# Patient Record
Sex: FEMALE | Race: WHITE | Employment: OTHER | URBAN - METROPOLITAN AREA
[De-identification: names, ages, dates, MRNs, and addresses within clinical notes are randomized per-mention and may not be internally consistent; named-entity substitution may affect disease eponyms.]

---

## 2017-01-01 ENCOUNTER — OFFICE VISIT (OUTPATIENT)
Dept: HEMATOLOGY/ONCOLOGY | Age: 82
End: 2017-01-01
Attending: INTERNAL MEDICINE
Payer: MEDICARE

## 2017-01-01 ENCOUNTER — APPOINTMENT (OUTPATIENT)
Dept: GENERAL RADIOLOGY | Age: 82
End: 2017-01-01
Attending: EMERGENCY MEDICINE
Payer: MEDICARE

## 2017-01-01 ENCOUNTER — APPOINTMENT (OUTPATIENT)
Dept: HEMATOLOGY/ONCOLOGY | Age: 82
End: 2017-01-01
Attending: INTERNAL MEDICINE
Payer: MEDICARE

## 2017-01-01 ENCOUNTER — SOCIAL WORK SERVICES (OUTPATIENT)
Dept: HEMATOLOGY/ONCOLOGY | Facility: HOSPITAL | Age: 82
End: 2017-01-01

## 2017-01-01 ENCOUNTER — TELEPHONE (OUTPATIENT)
Dept: HEMATOLOGY/ONCOLOGY | Facility: HOSPITAL | Age: 82
End: 2017-01-01

## 2017-01-01 ENCOUNTER — HOSPITAL ENCOUNTER (OUTPATIENT)
Dept: ULTRASOUND IMAGING | Age: 82
Discharge: HOME | End: 2017-01-01
Attending: INTERNAL MEDICINE
Payer: MEDICARE

## 2017-01-01 ENCOUNTER — SNF/IP PROF CHARGE ONLY (OUTPATIENT)
Dept: HEMATOLOGY/ONCOLOGY | Facility: HOSPITAL | Age: 82
End: 2017-01-01

## 2017-01-01 ENCOUNTER — HOSPITAL ENCOUNTER (EMERGENCY)
Age: 82
Discharge: HOME | End: 2017-01-01
Attending: EMERGENCY MEDICINE
Payer: MEDICARE

## 2017-01-01 ENCOUNTER — HOSPITAL ENCOUNTER (OUTPATIENT)
Dept: RADIATION ONCOLOGY | Age: 82
Discharge: HOME | End: 2017-01-01
Attending: RADIOLOGY
Payer: MEDICARE

## 2017-01-01 ENCOUNTER — APPOINTMENT (OUTPATIENT)
Dept: ULTRASOUND IMAGING | Age: 82
End: 2017-01-01
Attending: EMERGENCY MEDICINE
Payer: MEDICARE

## 2017-01-01 ENCOUNTER — NURSE ONLY (OUTPATIENT)
Dept: HEMATOLOGY/ONCOLOGY | Age: 82
End: 2017-01-01
Attending: INTERNAL MEDICINE
Payer: MEDICARE

## 2017-01-01 ENCOUNTER — OFFICE VISIT (OUTPATIENT)
Dept: HEMATOLOGY/ONCOLOGY | Facility: HOSPITAL | Age: 82
End: 2017-01-01
Attending: INTERNAL MEDICINE
Payer: MEDICARE

## 2017-01-01 ENCOUNTER — HOSPITAL ENCOUNTER (OUTPATIENT)
Dept: CV DIAGNOSTICS | Facility: HOSPITAL | Age: 82
Discharge: HOME | End: 2017-01-01
Attending: CLINICAL NURSE SPECIALIST
Payer: MEDICARE

## 2017-01-01 ENCOUNTER — APPOINTMENT (OUTPATIENT)
Dept: RADIATION ONCOLOGY | Age: 82
End: 2017-01-01
Attending: RADIOLOGY
Payer: MEDICARE

## 2017-01-01 ENCOUNTER — HOSPITAL ENCOUNTER (OUTPATIENT)
Dept: NUCLEAR MEDICINE | Facility: HOSPITAL | Age: 82
Discharge: HOME | End: 2017-01-01
Attending: INTERNAL MEDICINE
Payer: MEDICARE

## 2017-01-01 ENCOUNTER — MEDICAL CORRESPONDENCE (OUTPATIENT)
Dept: RADIATION ONCOLOGY | Age: 82
End: 2017-01-01

## 2017-01-01 ENCOUNTER — HOSPITAL ENCOUNTER (OUTPATIENT)
Dept: CT IMAGING | Facility: HOSPITAL | Age: 82
Discharge: HOME | End: 2017-01-01
Attending: CLINICAL NURSE SPECIALIST
Payer: MEDICARE

## 2017-01-01 ENCOUNTER — HOSPITAL ENCOUNTER (INPATIENT)
Facility: HOSPITAL | Age: 82
LOS: 4 days | Discharge: NURSING, REHAB OR LONG-TERM CARE | DRG: 948 | End: 2017-01-01
Attending: HOSPITALIST | Admitting: HOSPITALIST
Payer: MEDICARE

## 2017-01-01 ENCOUNTER — HOSPITAL ENCOUNTER (OUTPATIENT)
Dept: CT IMAGING | Age: 82
Discharge: HOME | End: 2017-01-01
Attending: INTERNAL MEDICINE
Payer: MEDICARE

## 2017-01-01 ENCOUNTER — PATIENT MESSAGE (OUTPATIENT)
Dept: HEMATOLOGY/ONCOLOGY | Facility: HOSPITAL | Age: 82
End: 2017-01-01

## 2017-01-01 ENCOUNTER — DIETICIAN VISIT (OUTPATIENT)
Dept: HEMATOLOGY/ONCOLOGY | Facility: HOSPITAL | Age: 82
End: 2017-01-01

## 2017-01-01 ENCOUNTER — HOSPITAL ENCOUNTER (OUTPATIENT)
Dept: RADIATION ONCOLOGY | Age: 82
End: 2017-01-01
Attending: RADIOLOGY
Payer: MEDICARE

## 2017-01-01 ENCOUNTER — HOSPITAL ENCOUNTER (OUTPATIENT)
Dept: GENERAL RADIOLOGY | Age: 82
Discharge: HOME | End: 2017-01-01
Attending: NURSE PRACTITIONER
Payer: MEDICARE

## 2017-01-01 ENCOUNTER — APPOINTMENT (OUTPATIENT)
Dept: GENERAL RADIOLOGY | Facility: HOSPITAL | Age: 82
DRG: 948 | End: 2017-01-01
Attending: HOSPITALIST
Payer: MEDICARE

## 2017-01-01 ENCOUNTER — LAB ENCOUNTER (OUTPATIENT)
Dept: LAB | Facility: HOSPITAL | Age: 82
End: 2017-01-01
Attending: INTERNAL MEDICINE
Payer: MEDICARE

## 2017-01-01 ENCOUNTER — APPOINTMENT (OUTPATIENT)
Dept: GENERAL RADIOLOGY | Facility: HOSPITAL | Age: 82
DRG: 948 | End: 2017-01-01
Attending: CLINICAL NURSE SPECIALIST
Payer: MEDICARE

## 2017-01-01 VITALS
TEMPERATURE: 99 F | SYSTOLIC BLOOD PRESSURE: 123 MMHG | WEIGHT: 121.19 LBS | HEART RATE: 67 BPM | DIASTOLIC BLOOD PRESSURE: 68 MMHG | RESPIRATION RATE: 18 BRPM | OXYGEN SATURATION: 94 % | BODY MASS INDEX: 22 KG/M2

## 2017-01-01 VITALS
SYSTOLIC BLOOD PRESSURE: 119 MMHG | RESPIRATION RATE: 16 BRPM | BODY MASS INDEX: 22.62 KG/M2 | WEIGHT: 124.5 LBS | HEART RATE: 82 BPM | DIASTOLIC BLOOD PRESSURE: 57 MMHG | OXYGEN SATURATION: 95 % | HEIGHT: 62.01 IN | TEMPERATURE: 97 F

## 2017-01-01 VITALS
SYSTOLIC BLOOD PRESSURE: 114 MMHG | DIASTOLIC BLOOD PRESSURE: 58 MMHG | HEART RATE: 71 BPM | TEMPERATURE: 97 F | OXYGEN SATURATION: 94 % | RESPIRATION RATE: 18 BRPM | WEIGHT: 130 LBS

## 2017-01-01 VITALS
SYSTOLIC BLOOD PRESSURE: 116 MMHG | WEIGHT: 123 LBS | DIASTOLIC BLOOD PRESSURE: 58 MMHG | OXYGEN SATURATION: 91 % | RESPIRATION RATE: 16 BRPM | HEIGHT: 61 IN | HEART RATE: 80 BPM | TEMPERATURE: 98 F | BODY MASS INDEX: 23.22 KG/M2

## 2017-01-01 VITALS
HEART RATE: 71 BPM | BODY MASS INDEX: 23.22 KG/M2 | SYSTOLIC BLOOD PRESSURE: 129 MMHG | RESPIRATION RATE: 18 BRPM | OXYGEN SATURATION: 98 % | DIASTOLIC BLOOD PRESSURE: 63 MMHG | DIASTOLIC BLOOD PRESSURE: 63 MMHG | WEIGHT: 128.88 LBS | SYSTOLIC BLOOD PRESSURE: 124 MMHG | TEMPERATURE: 99 F | HEART RATE: 72 BPM | HEIGHT: 61 IN | OXYGEN SATURATION: 95 % | WEIGHT: 123 LBS | TEMPERATURE: 98 F | RESPIRATION RATE: 16 BRPM

## 2017-01-01 VITALS
WEIGHT: 124.81 LBS | BODY MASS INDEX: 23 KG/M2 | OXYGEN SATURATION: 97 % | RESPIRATION RATE: 16 BRPM | HEART RATE: 96 BPM | SYSTOLIC BLOOD PRESSURE: 124 MMHG | TEMPERATURE: 97 F | DIASTOLIC BLOOD PRESSURE: 77 MMHG

## 2017-01-01 VITALS
DIASTOLIC BLOOD PRESSURE: 63 MMHG | RESPIRATION RATE: 18 BRPM | HEART RATE: 61 BPM | OXYGEN SATURATION: 99 % | TEMPERATURE: 98 F | SYSTOLIC BLOOD PRESSURE: 138 MMHG | WEIGHT: 127.5 LBS

## 2017-01-01 VITALS
BODY MASS INDEX: 21.71 KG/M2 | RESPIRATION RATE: 18 BRPM | OXYGEN SATURATION: 97 % | SYSTOLIC BLOOD PRESSURE: 110 MMHG | WEIGHT: 119.5 LBS | HEART RATE: 71 BPM | HEIGHT: 62.01 IN | TEMPERATURE: 98 F | DIASTOLIC BLOOD PRESSURE: 55 MMHG

## 2017-01-01 VITALS
HEIGHT: 60 IN | WEIGHT: 118.63 LBS | DIASTOLIC BLOOD PRESSURE: 69 MMHG | SYSTOLIC BLOOD PRESSURE: 116 MMHG | TEMPERATURE: 98 F | RESPIRATION RATE: 18 BRPM | OXYGEN SATURATION: 96 % | HEART RATE: 74 BPM | BODY MASS INDEX: 23.29 KG/M2

## 2017-01-01 VITALS
SYSTOLIC BLOOD PRESSURE: 115 MMHG | OXYGEN SATURATION: 99 % | HEART RATE: 80 BPM | RESPIRATION RATE: 18 BRPM | DIASTOLIC BLOOD PRESSURE: 74 MMHG | WEIGHT: 129 LBS | TEMPERATURE: 97 F

## 2017-01-01 VITALS
SYSTOLIC BLOOD PRESSURE: 112 MMHG | TEMPERATURE: 100 F | OXYGEN SATURATION: 92 % | RESPIRATION RATE: 18 BRPM | HEART RATE: 85 BPM | DIASTOLIC BLOOD PRESSURE: 69 MMHG

## 2017-01-01 VITALS
RESPIRATION RATE: 18 BRPM | OXYGEN SATURATION: 95 % | TEMPERATURE: 97 F | WEIGHT: 129 LBS | SYSTOLIC BLOOD PRESSURE: 111 MMHG | HEART RATE: 73 BPM | DIASTOLIC BLOOD PRESSURE: 66 MMHG

## 2017-01-01 VITALS
TEMPERATURE: 96 F | SYSTOLIC BLOOD PRESSURE: 131 MMHG | WEIGHT: 126.63 LBS | BODY MASS INDEX: 24 KG/M2 | DIASTOLIC BLOOD PRESSURE: 77 MMHG | HEART RATE: 80 BPM | RESPIRATION RATE: 16 BRPM | OXYGEN SATURATION: 95 %

## 2017-01-01 VITALS
HEART RATE: 93 BPM | RESPIRATION RATE: 18 BRPM | SYSTOLIC BLOOD PRESSURE: 134 MMHG | DIASTOLIC BLOOD PRESSURE: 69 MMHG | TEMPERATURE: 98 F | BODY MASS INDEX: 23 KG/M2 | OXYGEN SATURATION: 98 % | WEIGHT: 125.38 LBS

## 2017-01-01 VITALS
BODY MASS INDEX: 22.67 KG/M2 | TEMPERATURE: 99 F | HEIGHT: 62 IN | HEART RATE: 75 BPM | DIASTOLIC BLOOD PRESSURE: 65 MMHG | WEIGHT: 123.19 LBS | OXYGEN SATURATION: 97 % | SYSTOLIC BLOOD PRESSURE: 111 MMHG | RESPIRATION RATE: 18 BRPM

## 2017-01-01 VITALS
DIASTOLIC BLOOD PRESSURE: 70 MMHG | WEIGHT: 125.63 LBS | RESPIRATION RATE: 18 BRPM | TEMPERATURE: 97 F | SYSTOLIC BLOOD PRESSURE: 109 MMHG | OXYGEN SATURATION: 96 % | HEART RATE: 75 BPM | BODY MASS INDEX: 23 KG/M2

## 2017-01-01 VITALS
DIASTOLIC BLOOD PRESSURE: 66 MMHG | BODY MASS INDEX: 23 KG/M2 | HEART RATE: 79 BPM | TEMPERATURE: 97 F | WEIGHT: 123.38 LBS | SYSTOLIC BLOOD PRESSURE: 120 MMHG | RESPIRATION RATE: 18 BRPM | OXYGEN SATURATION: 95 %

## 2017-01-01 VITALS
SYSTOLIC BLOOD PRESSURE: 116 MMHG | HEART RATE: 84 BPM | TEMPERATURE: 99 F | OXYGEN SATURATION: 95 % | RESPIRATION RATE: 18 BRPM | DIASTOLIC BLOOD PRESSURE: 72 MMHG | WEIGHT: 120 LBS | BODY MASS INDEX: 22 KG/M2

## 2017-01-01 VITALS
HEART RATE: 67 BPM | TEMPERATURE: 99 F | SYSTOLIC BLOOD PRESSURE: 113 MMHG | DIASTOLIC BLOOD PRESSURE: 60 MMHG | WEIGHT: 125.5 LBS | OXYGEN SATURATION: 95 % | RESPIRATION RATE: 18 BRPM

## 2017-01-01 VITALS
RESPIRATION RATE: 18 BRPM | DIASTOLIC BLOOD PRESSURE: 68 MMHG | TEMPERATURE: 99 F | HEART RATE: 79 BPM | WEIGHT: 125.63 LBS | SYSTOLIC BLOOD PRESSURE: 119 MMHG | BODY MASS INDEX: 23 KG/M2 | OXYGEN SATURATION: 97 %

## 2017-01-01 VITALS
BODY MASS INDEX: 23 KG/M2 | HEART RATE: 73 BPM | OXYGEN SATURATION: 94 % | RESPIRATION RATE: 18 BRPM | WEIGHT: 123.38 LBS | SYSTOLIC BLOOD PRESSURE: 116 MMHG | TEMPERATURE: 99 F | DIASTOLIC BLOOD PRESSURE: 68 MMHG

## 2017-01-01 VITALS
BODY MASS INDEX: 22.56 KG/M2 | HEIGHT: 62 IN | TEMPERATURE: 99 F | OXYGEN SATURATION: 92 % | RESPIRATION RATE: 18 BRPM | DIASTOLIC BLOOD PRESSURE: 53 MMHG | WEIGHT: 122.63 LBS | SYSTOLIC BLOOD PRESSURE: 113 MMHG | HEART RATE: 78 BPM

## 2017-01-01 VITALS
WEIGHT: 124.38 LBS | OXYGEN SATURATION: 91 % | DIASTOLIC BLOOD PRESSURE: 69 MMHG | RESPIRATION RATE: 16 BRPM | HEART RATE: 68 BPM | SYSTOLIC BLOOD PRESSURE: 142 MMHG | TEMPERATURE: 97 F | BODY MASS INDEX: 23 KG/M2

## 2017-01-01 VITALS
SYSTOLIC BLOOD PRESSURE: 133 MMHG | OXYGEN SATURATION: 96 % | RESPIRATION RATE: 18 BRPM | HEART RATE: 69 BPM | TEMPERATURE: 98 F | WEIGHT: 122 LBS | DIASTOLIC BLOOD PRESSURE: 65 MMHG

## 2017-01-01 VITALS
WEIGHT: 130 LBS | HEART RATE: 80 BPM | SYSTOLIC BLOOD PRESSURE: 116 MMHG | DIASTOLIC BLOOD PRESSURE: 72 MMHG | OXYGEN SATURATION: 93 % | RESPIRATION RATE: 18 BRPM | TEMPERATURE: 99 F

## 2017-01-01 VITALS
OXYGEN SATURATION: 98 % | HEART RATE: 71 BPM | SYSTOLIC BLOOD PRESSURE: 132 MMHG | BODY MASS INDEX: 23 KG/M2 | RESPIRATION RATE: 20 BRPM | TEMPERATURE: 97 F | WEIGHT: 124.81 LBS | DIASTOLIC BLOOD PRESSURE: 79 MMHG

## 2017-01-01 VITALS
SYSTOLIC BLOOD PRESSURE: 117 MMHG | OXYGEN SATURATION: 96 % | TEMPERATURE: 99 F | DIASTOLIC BLOOD PRESSURE: 68 MMHG | HEART RATE: 85 BPM | RESPIRATION RATE: 18 BRPM

## 2017-01-01 VITALS
SYSTOLIC BLOOD PRESSURE: 126 MMHG | TEMPERATURE: 99 F | OXYGEN SATURATION: 97 % | HEART RATE: 76 BPM | DIASTOLIC BLOOD PRESSURE: 82 MMHG | RESPIRATION RATE: 16 BRPM

## 2017-01-01 VITALS
WEIGHT: 128 LBS | TEMPERATURE: 98 F | HEART RATE: 80 BPM | SYSTOLIC BLOOD PRESSURE: 95 MMHG | DIASTOLIC BLOOD PRESSURE: 64 MMHG | OXYGEN SATURATION: 97 %

## 2017-01-01 VITALS
OXYGEN SATURATION: 94 % | SYSTOLIC BLOOD PRESSURE: 116 MMHG | DIASTOLIC BLOOD PRESSURE: 75 MMHG | HEART RATE: 79 BPM | RESPIRATION RATE: 16 BRPM | TEMPERATURE: 98 F

## 2017-01-01 DIAGNOSIS — Z17.0 MALIGNANT NEOPLASM OF OVERLAPPING SITES OF LEFT BREAST IN FEMALE, ESTROGEN RECEPTOR POSITIVE (HCC): ICD-10-CM

## 2017-01-01 DIAGNOSIS — C50.812 MALIGNANT NEOPLASM OF OVERLAPPING SITES OF LEFT BREAST IN FEMALE, ESTROGEN RECEPTOR POSITIVE (HCC): ICD-10-CM

## 2017-01-01 DIAGNOSIS — L03.115 CELLULITIS OF RIGHT ANKLE: Primary | ICD-10-CM

## 2017-01-01 DIAGNOSIS — C78.7 LIVER METASTASES (HCC): ICD-10-CM

## 2017-01-01 DIAGNOSIS — C50.812 MALIGNANT NEOPLASM OF OVERLAPPING SITES OF LEFT FEMALE BREAST, UNSPECIFIED ESTROGEN RECEPTOR STATUS (HCC): ICD-10-CM

## 2017-01-01 DIAGNOSIS — C79.51 BONE METASTASES (HCC): Primary | ICD-10-CM

## 2017-01-01 DIAGNOSIS — C50.812 CANCER OF OVERLAPPING SITES OF LEFT FEMALE BREAST (HCC): ICD-10-CM

## 2017-01-01 DIAGNOSIS — G89.3 NEOPLASM RELATED PAIN: ICD-10-CM

## 2017-01-01 DIAGNOSIS — C79.51 BONE METASTASES (HCC): ICD-10-CM

## 2017-01-01 DIAGNOSIS — C50.812 MALIGNANT NEOPLASM OF OVERLAPPING SITES OF LEFT BREAST IN FEMALE, ESTROGEN RECEPTOR POSITIVE (HCC): Primary | ICD-10-CM

## 2017-01-01 DIAGNOSIS — T40.2X5A CONSTIPATION DUE TO OPIOID THERAPY: ICD-10-CM

## 2017-01-01 DIAGNOSIS — R53.83 FATIGUE DUE TO TREATMENT: ICD-10-CM

## 2017-01-01 DIAGNOSIS — J34.89 STUFFY AND RUNNY NOSE: ICD-10-CM

## 2017-01-01 DIAGNOSIS — M25.571 PAIN AND SWELLING OF ANKLE, RIGHT: ICD-10-CM

## 2017-01-01 DIAGNOSIS — Z71.89 OTHER SPECIFIED COUNSELING: ICD-10-CM

## 2017-01-01 DIAGNOSIS — C50.919 METASTATIC BREAST CANCER (HCC): Primary | ICD-10-CM

## 2017-01-01 DIAGNOSIS — Z17.0 MALIGNANT NEOPLASM OF OVERLAPPING SITES OF LEFT BREAST IN FEMALE, ESTROGEN RECEPTOR POSITIVE (HCC): Primary | ICD-10-CM

## 2017-01-01 DIAGNOSIS — F32.89 OTHER DEPRESSION: ICD-10-CM

## 2017-01-01 DIAGNOSIS — R60.0 BILATERAL LEG EDEMA: Primary | ICD-10-CM

## 2017-01-01 DIAGNOSIS — C79.9 METASTATIC ADENOCARCINOMA (HCC): Primary | ICD-10-CM

## 2017-01-01 DIAGNOSIS — M25.561 RIGHT KNEE PAIN: ICD-10-CM

## 2017-01-01 DIAGNOSIS — M25.561 ACUTE PAIN OF RIGHT KNEE: Primary | ICD-10-CM

## 2017-01-01 DIAGNOSIS — K13.79 MOUTH SORES: ICD-10-CM

## 2017-01-01 DIAGNOSIS — M25.511 ACUTE PAIN OF RIGHT SHOULDER: ICD-10-CM

## 2017-01-01 DIAGNOSIS — K59.03 CONSTIPATION DUE TO OPIOID THERAPY: ICD-10-CM

## 2017-01-01 DIAGNOSIS — R50.9 LOW GRADE FEVER: ICD-10-CM

## 2017-01-01 DIAGNOSIS — C79.51 PAIN DUE TO MALIGNANT NEOPLASM METASTATIC TO BONE (HCC): Primary | ICD-10-CM

## 2017-01-01 DIAGNOSIS — R05.9 COUGH: ICD-10-CM

## 2017-01-01 DIAGNOSIS — R60.0 LEG EDEMA, RIGHT: ICD-10-CM

## 2017-01-01 DIAGNOSIS — I82.442 ACUTE DEEP VEIN THROMBOSIS (DVT) OF TIBIAL VEIN OF LEFT LOWER EXTREMITY (HCC): ICD-10-CM

## 2017-01-01 DIAGNOSIS — C50.812 MALIGNANT NEOPLASM OF OVERLAPPING SITES OF LEFT FEMALE BREAST, UNSPECIFIED ESTROGEN RECEPTOR STATUS (HCC): Primary | ICD-10-CM

## 2017-01-01 DIAGNOSIS — G47.9 DIFFICULTY SLEEPING: ICD-10-CM

## 2017-01-01 DIAGNOSIS — K21.9 GASTROESOPHAGEAL REFLUX DISEASE WITHOUT ESOPHAGITIS: ICD-10-CM

## 2017-01-01 DIAGNOSIS — M25.471 PAIN AND SWELLING OF ANKLE, RIGHT: ICD-10-CM

## 2017-01-01 DIAGNOSIS — R60.0 LEG EDEMA, RIGHT: Primary | ICD-10-CM

## 2017-01-01 DIAGNOSIS — G89.3 NEOPLASM RELATED PAIN: Primary | ICD-10-CM

## 2017-01-01 DIAGNOSIS — G89.3 PAIN FROM BONE METASTASES (HCC): ICD-10-CM

## 2017-01-01 DIAGNOSIS — C50.812 CANCER OF OVERLAPPING SITES OF LEFT FEMALE BREAST (HCC): Primary | ICD-10-CM

## 2017-01-01 DIAGNOSIS — R63.0 LACK OF APPETITE: ICD-10-CM

## 2017-01-01 DIAGNOSIS — S42.202A CLOSED FRACTURE OF PROXIMAL END OF LEFT HUMERUS, UNSPECIFIED FRACTURE MORPHOLOGY, INITIAL ENCOUNTER: ICD-10-CM

## 2017-01-01 DIAGNOSIS — K59.03 DRUG-INDUCED CONSTIPATION: ICD-10-CM

## 2017-01-01 DIAGNOSIS — M89.8X9 MALIGNANT BONE PAIN: Primary | ICD-10-CM

## 2017-01-01 DIAGNOSIS — G89.3 PAIN DUE TO MALIGNANT NEOPLASM METASTATIC TO BONE (HCC): Primary | ICD-10-CM

## 2017-01-01 DIAGNOSIS — M25.571 ACUTE RIGHT ANKLE PAIN: Primary | ICD-10-CM

## 2017-01-01 DIAGNOSIS — R60.0 BILATERAL LEG EDEMA: ICD-10-CM

## 2017-01-01 DIAGNOSIS — C79.51 PAIN FROM BONE METASTASES (HCC): ICD-10-CM

## 2017-01-01 DIAGNOSIS — L03.115 CELLULITIS OF RIGHT ANKLE: ICD-10-CM

## 2017-01-01 DIAGNOSIS — I82.4Z2 ACUTE DEEP VEIN THROMBOSIS (DVT) OF DISTAL VEIN OF LEFT LOWER EXTREMITY (HCC): Primary | ICD-10-CM

## 2017-01-01 DIAGNOSIS — L03.115 CELLULITIS OF RIGHT LOWER EXTREMITY: ICD-10-CM

## 2017-01-01 DIAGNOSIS — Z71.89 GOALS OF CARE, COUNSELING/DISCUSSION: Primary | ICD-10-CM

## 2017-01-01 DIAGNOSIS — G89.3 MALIGNANT BONE PAIN: Primary | ICD-10-CM

## 2017-01-01 DIAGNOSIS — R53.0 NEOPLASTIC MALIGNANT RELATED FATIGUE: ICD-10-CM

## 2017-01-01 DIAGNOSIS — S42.202A CLOSED FRACTURE OF PROXIMAL END OF LEFT HUMERUS, UNSPECIFIED FRACTURE MORPHOLOGY, INITIAL ENCOUNTER: Primary | ICD-10-CM

## 2017-01-01 LAB
ALBUMIN SERPL-MCNC: 3.1 G/DL (ref 3.5–4.8)
ALBUMIN SERPL-MCNC: 3.1 G/DL (ref 3.5–4.8)
ALBUMIN SERPL-MCNC: 3.2 G/DL (ref 3.5–4.8)
ALBUMIN SERPL-MCNC: 3.3 G/DL (ref 3.5–4.8)
ALBUMIN SERPL-MCNC: 3.4 G/DL (ref 3.5–4.8)
ALP LIVER SERPL-CCNC: 48 U/L (ref 55–142)
ALP LIVER SERPL-CCNC: 49 U/L (ref 55–142)
ALP LIVER SERPL-CCNC: 54 U/L (ref 55–142)
ALP LIVER SERPL-CCNC: 56 U/L (ref 55–142)
ALP LIVER SERPL-CCNC: 59 U/L (ref 55–142)
ALT SERPL-CCNC: 11 U/L (ref 14–54)
ALT SERPL-CCNC: 12 U/L (ref 14–54)
ALT SERPL-CCNC: 13 U/L (ref 14–54)
ALT SERPL-CCNC: 13 U/L (ref 14–54)
ALT SERPL-CCNC: 16 U/L (ref 14–54)
AST SERPL-CCNC: 12 U/L (ref 15–41)
AST SERPL-CCNC: 15 U/L (ref 15–41)
AST SERPL-CCNC: 16 U/L (ref 15–41)
AST SERPL-CCNC: 17 U/L (ref 15–41)
AST SERPL-CCNC: 17 U/L (ref 15–41)
BASOPHILS # BLD AUTO: 0.01 X10(3) UL (ref 0–0.1)
BASOPHILS # BLD AUTO: 0.02 X10(3) UL (ref 0–0.1)
BASOPHILS # BLD AUTO: 0.03 X10(3) UL (ref 0–0.1)
BASOPHILS # BLD AUTO: 0.03 X10(3) UL (ref 0–0.1)
BASOPHILS # BLD AUTO: 0.04 X10(3) UL (ref 0–0.1)
BASOPHILS NFR BLD AUTO: 0.2 %
BASOPHILS NFR BLD AUTO: 0.5 %
BASOPHILS NFR BLD AUTO: 1 %
BASOPHILS NFR BLD AUTO: 1.3 %
BASOPHILS NFR BLD AUTO: 1.3 %
BILIRUB SERPL-MCNC: 0.3 MG/DL (ref 0.1–2)
BILIRUB SERPL-MCNC: 0.3 MG/DL (ref 0.1–2)
BILIRUB SERPL-MCNC: 0.4 MG/DL (ref 0.1–2)
BREAST CARCINOMA AG (CA2729): 152.4 U/ML (ref ?–38)
BREAST CARCINOMA AG (CA2729): 231.6 U/ML (ref ?–38)
BREAST CARCINOMA AG (CA2729): 274.1 U/ML (ref ?–38)
BUN BLD-MCNC: 14 MG/DL (ref 8–20)
BUN BLD-MCNC: 15 MG/DL (ref 8–20)
BUN BLD-MCNC: 16 MG/DL (ref 8–20)
BUN BLD-MCNC: 19 MG/DL (ref 8–20)
BUN BLD-MCNC: 21 MG/DL (ref 8–20)
CALCIUM BLD-MCNC: 8.7 MG/DL (ref 8.3–10.3)
CALCIUM BLD-MCNC: 8.7 MG/DL (ref 8.3–10.3)
CALCIUM BLD-MCNC: 8.8 MG/DL (ref 8.3–10.3)
CALCIUM BLD-MCNC: 8.9 MG/DL (ref 8.3–10.3)
CALCIUM BLD-MCNC: 9.2 MG/DL (ref 8.3–10.3)
CHLORIDE: 107 MMOL/L (ref 101–111)
CHLORIDE: 108 MMOL/L (ref 101–111)
CHLORIDE: 109 MMOL/L (ref 101–111)
CHLORIDE: 109 MMOL/L (ref 101–111)
CHLORIDE: 110 MMOL/L (ref 101–111)
CO2: 23 MMOL/L (ref 22–32)
CO2: 25 MMOL/L (ref 22–32)
CO2: 25 MMOL/L (ref 22–32)
CO2: 27 MMOL/L (ref 22–32)
CO2: 27 MMOL/L (ref 22–32)
CREAT BLD-MCNC: 0.97 MG/DL (ref 0.55–1.02)
CREAT BLD-MCNC: 0.97 MG/DL (ref 0.55–1.02)
CREAT BLD-MCNC: 0.98 MG/DL (ref 0.55–1.02)
CREAT BLD-MCNC: 1.16 MG/DL (ref 0.55–1.02)
CREAT BLD-MCNC: 1.17 MG/DL (ref 0.55–1.02)
EOSINOPHIL # BLD AUTO: 0.01 X10(3) UL (ref 0–0.3)
EOSINOPHIL # BLD AUTO: 0.05 X10(3) UL (ref 0–0.3)
EOSINOPHIL # BLD AUTO: 0.06 X10(3) UL (ref 0–0.3)
EOSINOPHIL # BLD AUTO: 0.08 X10(3) UL (ref 0–0.3)
EOSINOPHIL # BLD AUTO: 0.13 X10(3) UL (ref 0–0.3)
EOSINOPHIL NFR BLD AUTO: 0.2 %
EOSINOPHIL NFR BLD AUTO: 1.9 %
EOSINOPHIL NFR BLD AUTO: 2.2 %
EOSINOPHIL NFR BLD AUTO: 2.6 %
EOSINOPHIL NFR BLD AUTO: 3.5 %
ERYTHROCYTE [DISTWIDTH] IN BLOOD BY AUTOMATED COUNT: 14.1 % (ref 11.5–16)
ERYTHROCYTE [DISTWIDTH] IN BLOOD BY AUTOMATED COUNT: 14.1 % (ref 11.5–16)
ERYTHROCYTE [DISTWIDTH] IN BLOOD BY AUTOMATED COUNT: 14.3 % (ref 11.5–16)
ERYTHROCYTE [DISTWIDTH] IN BLOOD BY AUTOMATED COUNT: 14.4 % (ref 11.5–16)
ERYTHROCYTE [DISTWIDTH] IN BLOOD BY AUTOMATED COUNT: 15 % (ref 11.5–16)
GLUCOSE BLD-MCNC: 103 MG/DL (ref 70–99)
GLUCOSE BLD-MCNC: 107 MG/DL (ref 70–99)
GLUCOSE BLD-MCNC: 128 MG/DL (ref 70–99)
GLUCOSE BLD-MCNC: 75 MG/DL (ref 70–99)
GLUCOSE BLD-MCNC: 91 MG/DL (ref 70–99)
HCT VFR BLD AUTO: 32.2 % (ref 34–50)
HCT VFR BLD AUTO: 33 % (ref 34–50)
HCT VFR BLD AUTO: 33.4 % (ref 34–50)
HCT VFR BLD AUTO: 34.7 % (ref 34–50)
HCT VFR BLD AUTO: 35.3 % (ref 34–50)
HGB BLD-MCNC: 10.5 G/DL (ref 12–16)
HGB BLD-MCNC: 11 G/DL (ref 12–16)
HGB BLD-MCNC: 11.1 G/DL (ref 12–16)
HGB BLD-MCNC: 11.2 G/DL (ref 12–16)
HGB BLD-MCNC: 11.4 G/DL (ref 12–16)
IMMATURE GRANULOCYTE COUNT: 0.01 X10(3) UL (ref 0–1)
IMMATURE GRANULOCYTE COUNT: 0.02 X10(3) UL (ref 0–1)
IMMATURE GRANULOCYTE RATIO %: 0.3 %
IMMATURE GRANULOCYTE RATIO %: 0.4 %
LDH: 147 U/L (ref 84–246)
LDH: 171 U/L (ref 84–246)
LDH: 180 U/L (ref 84–246)
LYMPHOCYTES # BLD AUTO: 0.23 X10(3) UL (ref 0.9–4)
LYMPHOCYTES # BLD AUTO: 0.27 X10(3) UL (ref 0.9–4)
LYMPHOCYTES # BLD AUTO: 0.29 X10(3) UL (ref 0.9–4)
LYMPHOCYTES # BLD AUTO: 0.29 X10(3) UL (ref 0.9–4)
LYMPHOCYTES # BLD AUTO: 0.32 X10(3) UL (ref 0.9–4)
LYMPHOCYTES NFR BLD AUTO: 10.2 %
LYMPHOCYTES NFR BLD AUTO: 11.7 %
LYMPHOCYTES NFR BLD AUTO: 3.5 %
LYMPHOCYTES NFR BLD AUTO: 7.8 %
LYMPHOCYTES NFR BLD AUTO: 9.3 %
M PROTEIN MFR SERPL ELPH: 6.2 G/DL (ref 6.1–8.3)
M PROTEIN MFR SERPL ELPH: 6.2 G/DL (ref 6.1–8.3)
M PROTEIN MFR SERPL ELPH: 6.4 G/DL (ref 6.1–8.3)
M PROTEIN MFR SERPL ELPH: 6.5 G/DL (ref 6.1–8.3)
M PROTEIN MFR SERPL ELPH: 6.5 G/DL (ref 6.1–8.3)
MCH RBC QN AUTO: 32.8 PG (ref 27–33.2)
MCH RBC QN AUTO: 33.3 PG (ref 27–33.2)
MCH RBC QN AUTO: 33.4 PG (ref 27–33.2)
MCH RBC QN AUTO: 33.5 PG (ref 27–33.2)
MCH RBC QN AUTO: 34.2 PG (ref 27–33.2)
MCHC RBC AUTO-ENTMCNC: 32.3 G/DL (ref 31–37)
MCHC RBC AUTO-ENTMCNC: 32.3 G/DL (ref 31–37)
MCHC RBC AUTO-ENTMCNC: 32.6 G/DL (ref 31–37)
MCHC RBC AUTO-ENTMCNC: 33.2 G/DL (ref 31–37)
MCHC RBC AUTO-ENTMCNC: 33.3 G/DL (ref 31–37)
MCV RBC AUTO: 100.6 FL (ref 81–100)
MCV RBC AUTO: 101.4 FL (ref 81–100)
MCV RBC AUTO: 102.5 FL (ref 81–100)
MCV RBC AUTO: 102.8 FL (ref 81–100)
MCV RBC AUTO: 103.3 FL (ref 81–100)
MONOCYTES # BLD AUTO: 0.17 X10(3) UL (ref 0.1–0.6)
MONOCYTES # BLD AUTO: 0.19 X10(3) UL (ref 0.1–0.6)
MONOCYTES # BLD AUTO: 0.24 X10(3) UL (ref 0.1–0.6)
MONOCYTES # BLD AUTO: 0.32 X10(3) UL (ref 0.1–0.6)
MONOCYTES # BLD AUTO: 0.78 X10(3) UL (ref 0.1–0.6)
MONOCYTES NFR BLD AUTO: 10.2 %
MONOCYTES NFR BLD AUTO: 12 %
MONOCYTES NFR BLD AUTO: 5.1 %
MONOCYTES NFR BLD AUTO: 7.4 %
MONOCYTES NFR BLD AUTO: 7.7 %
NEUTROPHIL ABS PRELIM: 1.78 X10 (3) UL (ref 1.3–6.7)
NEUTROPHIL ABS PRELIM: 2.37 X10 (3) UL (ref 1.3–6.7)
NEUTROPHIL ABS PRELIM: 2.49 X10 (3) UL (ref 1.3–6.7)
NEUTROPHIL ABS PRELIM: 3.1 X10 (3) UL (ref 1.3–6.7)
NEUTROPHIL ABS PRELIM: 5.44 X10 (3) UL (ref 1.3–6.7)
NEUTROPHILS # BLD AUTO: 1.78 X10(3) UL (ref 1.3–6.7)
NEUTROPHILS # BLD AUTO: 2.37 X10(3) UL (ref 1.3–6.7)
NEUTROPHILS # BLD AUTO: 2.49 X10(3) UL (ref 1.3–6.7)
NEUTROPHILS # BLD AUTO: 3.1 X10(3) UL (ref 1.3–6.7)
NEUTROPHILS # BLD AUTO: 5.44 X10(3) UL (ref 1.3–6.7)
NEUTROPHILS NFR BLD AUTO: 75.7 %
NEUTROPHILS NFR BLD AUTO: 77 %
NEUTROPHILS NFR BLD AUTO: 79.5 %
NEUTROPHILS NFR BLD AUTO: 82.8 %
NEUTROPHILS NFR BLD AUTO: 83.8 %
PLATELET # BLD AUTO: 131 10(3)UL (ref 150–450)
PLATELET # BLD AUTO: 137 10(3)UL (ref 150–450)
PLATELET # BLD AUTO: 155 10(3)UL (ref 150–450)
PLATELET # BLD AUTO: 173 10(3)UL (ref 150–450)
PLATELET # BLD AUTO: 204 10(3)UL (ref 150–450)
POTASSIUM SERPL-SCNC: 3.6 MMOL/L (ref 3.6–5.1)
POTASSIUM SERPL-SCNC: 4 MMOL/L (ref 3.6–5.1)
POTASSIUM SERPL-SCNC: 4 MMOL/L (ref 3.6–5.1)
POTASSIUM SERPL-SCNC: 4.1 MMOL/L (ref 3.6–5.1)
POTASSIUM SERPL-SCNC: 4.2 MMOL/L (ref 3.6–5.1)
RBC # BLD AUTO: 3.14 X10(6)UL (ref 3.8–5.1)
RBC # BLD AUTO: 3.25 X10(6)UL (ref 3.8–5.1)
RBC # BLD AUTO: 3.28 X10(6)UL (ref 3.8–5.1)
RBC # BLD AUTO: 3.36 X10(6)UL (ref 3.8–5.1)
RBC # BLD AUTO: 3.48 X10(6)UL (ref 3.8–5.1)
RED CELL DISTRIBUTION WIDTH-SD: 50.8 FL (ref 35.1–46.3)
RED CELL DISTRIBUTION WIDTH-SD: 52.9 FL (ref 35.1–46.3)
RED CELL DISTRIBUTION WIDTH-SD: 54 FL (ref 35.1–46.3)
RED CELL DISTRIBUTION WIDTH-SD: 54.8 FL (ref 35.1–46.3)
RED CELL DISTRIBUTION WIDTH-SD: 56.3 FL (ref 35.1–46.3)
SODIUM SERPL-SCNC: 138 MMOL/L (ref 136–144)
SODIUM SERPL-SCNC: 139 MMOL/L (ref 136–144)
SODIUM SERPL-SCNC: 139 MMOL/L (ref 136–144)
SODIUM SERPL-SCNC: 140 MMOL/L (ref 136–144)
SODIUM SERPL-SCNC: 140 MMOL/L (ref 136–144)
URIC ACID: 3.3 MG/DL (ref 2.4–8)
WBC # BLD AUTO: 2.3 X10(3) UL (ref 4–13)
WBC # BLD AUTO: 3.1 X10(3) UL (ref 4–13)
WBC # BLD AUTO: 3.1 X10(3) UL (ref 4–13)
WBC # BLD AUTO: 3.7 X10(3) UL (ref 4–13)
WBC # BLD AUTO: 6.5 X10(3) UL (ref 4–13)

## 2017-01-01 PROCEDURE — 82310 ASSAY OF CALCIUM: CPT

## 2017-01-01 PROCEDURE — 93307 TTE W/O DOPPLER COMPLETE: CPT | Performed by: CLINICAL NURSE SPECIALIST

## 2017-01-01 PROCEDURE — 80053 COMPREHEN METABOLIC PANEL: CPT | Performed by: INTERNAL MEDICINE

## 2017-01-01 PROCEDURE — 96401 CHEMO ANTI-NEOPL SQ/IM: CPT

## 2017-01-01 PROCEDURE — G9678 ONCOLOGY CARE MODEL SERVICE: HCPCS | Performed by: INTERNAL MEDICINE

## 2017-01-01 PROCEDURE — 96374 THER/PROPH/DIAG INJ IV PUSH: CPT

## 2017-01-01 PROCEDURE — 99284 EMERGENCY DEPT VISIT MOD MDM: CPT | Performed by: EMERGENCY MEDICINE

## 2017-01-01 PROCEDURE — 77301 RADIOTHERAPY DOSE PLAN IMRT: CPT | Performed by: RADIOLOGY

## 2017-01-01 PROCEDURE — 73030 X-RAY EXAM OF SHOULDER: CPT | Performed by: EMERGENCY MEDICINE

## 2017-01-01 PROCEDURE — 96372 THER/PROPH/DIAG INJ SC/IM: CPT

## 2017-01-01 PROCEDURE — 72100 X-RAY EXAM L-S SPINE 2/3 VWS: CPT | Performed by: HOSPITALIST

## 2017-01-01 PROCEDURE — 99215 OFFICE O/P EST HI 40 MIN: CPT | Performed by: CLINICAL NURSE SPECIALIST

## 2017-01-01 PROCEDURE — 99223 1ST HOSP IP/OBS HIGH 75: CPT | Performed by: NURSE PRACTITIONER

## 2017-01-01 PROCEDURE — 99232 SBSQ HOSP IP/OBS MODERATE 35: CPT | Performed by: INTERNAL MEDICINE

## 2017-01-01 PROCEDURE — 82565 ASSAY OF CREATININE: CPT

## 2017-01-01 PROCEDURE — 36415 COLL VENOUS BLD VENIPUNCTURE: CPT

## 2017-01-01 PROCEDURE — 77338 DESIGN MLC DEVICE FOR IMRT: CPT | Performed by: RADIOLOGY

## 2017-01-01 PROCEDURE — 85025 COMPLETE CBC W/AUTO DIFF WBC: CPT

## 2017-01-01 PROCEDURE — 85652 RBC SED RATE AUTOMATED: CPT | Performed by: EMERGENCY MEDICINE

## 2017-01-01 PROCEDURE — 99233 SBSQ HOSP IP/OBS HIGH 50: CPT | Performed by: INTERNAL MEDICINE

## 2017-01-01 PROCEDURE — 96402 CHEMO HORMON ANTINEOPL SQ/IM: CPT

## 2017-01-01 PROCEDURE — 99215 OFFICE O/P EST HI 40 MIN: CPT | Performed by: INTERNAL MEDICINE

## 2017-01-01 PROCEDURE — 77336 RADIATION PHYSICS CONSULT: CPT | Performed by: RADIOLOGY

## 2017-01-01 PROCEDURE — 99214 OFFICE O/P EST MOD 30 MIN: CPT | Performed by: INTERNAL MEDICINE

## 2017-01-01 PROCEDURE — 77300 RADIATION THERAPY DOSE PLAN: CPT | Performed by: RADIOLOGY

## 2017-01-01 PROCEDURE — 77334 RADIATION TREATMENT AID(S): CPT | Performed by: RADIOLOGY

## 2017-01-01 PROCEDURE — 83615 LACTATE (LD) (LDH) ENZYME: CPT

## 2017-01-01 PROCEDURE — 93970 EXTREMITY STUDY: CPT | Performed by: EMERGENCY MEDICINE

## 2017-01-01 PROCEDURE — 77290 THER RAD SIMULAJ FIELD CPLX: CPT | Performed by: RADIOLOGY

## 2017-01-01 PROCEDURE — 99214 OFFICE O/P EST MOD 30 MIN: CPT | Performed by: NURSE PRACTITIONER

## 2017-01-01 PROCEDURE — 86300 IMMUNOASSAY TUMOR CA 15-3: CPT | Performed by: INTERNAL MEDICINE

## 2017-01-01 PROCEDURE — 80053 COMPREHEN METABOLIC PANEL: CPT

## 2017-01-01 PROCEDURE — 77373 STRTCTC BDY RAD THER TX DLVR: CPT | Performed by: RADIOLOGY

## 2017-01-01 PROCEDURE — 77293 RESPIRATOR MOTION MGMT SIMUL: CPT | Performed by: RADIOLOGY

## 2017-01-01 PROCEDURE — 99213 OFFICE O/P EST LOW 20 MIN: CPT

## 2017-01-01 PROCEDURE — 73090 X-RAY EXAM OF FOREARM: CPT | Performed by: EMERGENCY MEDICINE

## 2017-01-01 PROCEDURE — 99284 EMERGENCY DEPT VISIT MOD MDM: CPT

## 2017-01-01 PROCEDURE — 86140 C-REACTIVE PROTEIN: CPT | Performed by: EMERGENCY MEDICINE

## 2017-01-01 PROCEDURE — 99214 OFFICE O/P EST MOD 30 MIN: CPT | Performed by: CLINICAL NURSE SPECIALIST

## 2017-01-01 PROCEDURE — 80048 BASIC METABOLIC PNL TOTAL CA: CPT

## 2017-01-01 PROCEDURE — 83615 LACTATE (LD) (LDH) ENZYME: CPT | Performed by: INTERNAL MEDICINE

## 2017-01-01 PROCEDURE — 99232 SBSQ HOSP IP/OBS MODERATE 35: CPT | Performed by: HOSPITALIST

## 2017-01-01 PROCEDURE — 80053 COMPREHEN METABOLIC PANEL: CPT | Performed by: EMERGENCY MEDICINE

## 2017-01-01 PROCEDURE — 73610 X-RAY EXAM OF ANKLE: CPT | Performed by: EMERGENCY MEDICINE

## 2017-01-01 PROCEDURE — 99285 EMERGENCY DEPT VISIT HI MDM: CPT

## 2017-01-01 PROCEDURE — 71260 CT THORAX DX C+: CPT | Performed by: INTERNAL MEDICINE

## 2017-01-01 PROCEDURE — 99214 OFFICE O/P EST MOD 30 MIN: CPT

## 2017-01-01 PROCEDURE — 99239 HOSP IP/OBS DSCHRG MGMT >30: CPT | Performed by: HOSPITALIST

## 2017-01-01 PROCEDURE — 88321 CONSLTJ&REPRT SLD PREP ELSWR: CPT

## 2017-01-01 PROCEDURE — 73030 X-RAY EXAM OF SHOULDER: CPT | Performed by: NURSE PRACTITIONER

## 2017-01-01 PROCEDURE — 73130 X-RAY EXAM OF HAND: CPT | Performed by: EMERGENCY MEDICINE

## 2017-01-01 PROCEDURE — 76700 US EXAM ABDOM COMPLETE: CPT | Performed by: INTERNAL MEDICINE

## 2017-01-01 PROCEDURE — 77399 UNLISTED PX MED RADJ PHYSICS: CPT | Performed by: RADIOLOGY

## 2017-01-01 PROCEDURE — 82962 GLUCOSE BLOOD TEST: CPT

## 2017-01-01 PROCEDURE — 85025 COMPLETE CBC W/AUTO DIFF WBC: CPT | Performed by: INTERNAL MEDICINE

## 2017-01-01 PROCEDURE — 74177 CT ABD & PELVIS W/CONTRAST: CPT | Performed by: INTERNAL MEDICINE

## 2017-01-01 PROCEDURE — 99213 OFFICE O/P EST LOW 20 MIN: CPT | Performed by: CLINICAL NURSE SPECIALIST

## 2017-01-01 PROCEDURE — 99213 OFFICE O/P EST LOW 20 MIN: CPT | Performed by: NURSE PRACTITIONER

## 2017-01-01 PROCEDURE — 99215 OFFICE O/P EST HI 40 MIN: CPT | Performed by: NURSE PRACTITIONER

## 2017-01-01 PROCEDURE — 99223 1ST HOSP IP/OBS HIGH 75: CPT | Performed by: HOSPITALIST

## 2017-01-01 PROCEDURE — 74177 CT ABD & PELVIS W/CONTRAST: CPT | Performed by: CLINICAL NURSE SPECIALIST

## 2017-01-01 PROCEDURE — 85025 COMPLETE CBC W/AUTO DIFF WBC: CPT | Performed by: EMERGENCY MEDICINE

## 2017-01-01 PROCEDURE — 99231 SBSQ HOSP IP/OBS SF/LOW 25: CPT | Performed by: INTERNAL MEDICINE

## 2017-01-01 PROCEDURE — 93971 EXTREMITY STUDY: CPT | Performed by: INTERNAL MEDICINE

## 2017-01-01 PROCEDURE — 96375 TX/PRO/DX INJ NEW DRUG ADDON: CPT

## 2017-01-01 PROCEDURE — 99497 ADVNCD CARE PLAN 30 MIN: CPT | Performed by: NURSE PRACTITIONER

## 2017-01-01 PROCEDURE — 71260 CT THORAX DX C+: CPT | Performed by: CLINICAL NURSE SPECIALIST

## 2017-01-01 PROCEDURE — 99223 1ST HOSP IP/OBS HIGH 75: CPT | Performed by: INTERNAL MEDICINE

## 2017-01-01 PROCEDURE — 78815 PET IMAGE W/CT SKULL-THIGH: CPT | Performed by: INTERNAL MEDICINE

## 2017-01-01 PROCEDURE — 99233 SBSQ HOSP IP/OBS HIGH 50: CPT | Performed by: HOSPITALIST

## 2017-01-01 PROCEDURE — 71020 XR CHEST PA + LAT CHEST (CPT=71020): CPT | Performed by: CLINICAL NURSE SPECIALIST

## 2017-01-01 PROCEDURE — 77470 SPECIAL RADIATION TREATMENT: CPT | Performed by: RADIOLOGY

## 2017-01-01 PROCEDURE — 99205 OFFICE O/P NEW HI 60 MIN: CPT | Performed by: INTERNAL MEDICINE

## 2017-01-01 RX ORDER — OXYCODONE HYDROCHLORIDE 30 MG/1
30 TABLET, FILM COATED, EXTENDED RELEASE ORAL EVERY 12 HOURS
Qty: 60 EACH | Refills: 0 | Status: SHIPPED | OUTPATIENT
Start: 2017-01-01 | End: 2018-01-01

## 2017-01-01 RX ORDER — LIDOCAINE 50 MG/G
1 PATCH TOPICAL EVERY 24 HOURS
Status: DISCONTINUED | OUTPATIENT
Start: 2017-01-01 | End: 2017-01-01

## 2017-01-01 RX ORDER — LAMOTRIGINE 25 MG/1
500 TABLET ORAL ONCE
Status: COMPLETED | OUTPATIENT
Start: 2017-01-01 | End: 2017-01-01

## 2017-01-01 RX ORDER — OXYCODONE HCL 10 MG/1
10 TABLET, FILM COATED, EXTENDED RELEASE ORAL 2 TIMES DAILY
Qty: 60 TABLET | Refills: 0 | Status: SHIPPED | COMMUNITY
Start: 2017-01-01 | End: 2017-01-01

## 2017-01-01 RX ORDER — OXYCODONE HCL 10 MG/1
10 TABLET, FILM COATED, EXTENDED RELEASE ORAL 2 TIMES DAILY
Qty: 60 TABLET | Refills: 0 | Status: SHIPPED | OUTPATIENT
Start: 2017-01-01 | End: 2017-01-01

## 2017-01-01 RX ORDER — GABAPENTIN 100 MG/1
100 CAPSULE ORAL NIGHTLY
Qty: 30 CAPSULE | Refills: 0 | Status: SHIPPED | OUTPATIENT
Start: 2017-01-01 | End: 2018-01-01

## 2017-01-01 RX ORDER — DEXAMETHASONE 4 MG/1
4 TABLET ORAL
Qty: 30 TABLET | Refills: 2 | Status: SHIPPED | OUTPATIENT
Start: 2017-01-01 | End: 2017-01-01

## 2017-01-01 RX ORDER — OXYCODONE HYDROCHLORIDE 5 MG/1
10 TABLET ORAL EVERY 4 HOURS PRN
Status: DISCONTINUED | OUTPATIENT
Start: 2017-01-01 | End: 2017-01-01

## 2017-01-01 RX ORDER — ACETAMINOPHEN 325 MG/1
650 TABLET ORAL EVERY 6 HOURS PRN
Status: DISCONTINUED | OUTPATIENT
Start: 2017-01-01 | End: 2017-01-01

## 2017-01-01 RX ORDER — LAMOTRIGINE 25 MG/1
500 TABLET ORAL ONCE
Status: CANCELLED | OUTPATIENT
Start: 2017-01-01

## 2017-01-01 RX ORDER — OXYCODONE HYDROCHLORIDE 15 MG/1
30 TABLET, FILM COATED, EXTENDED RELEASE ORAL EVERY 12 HOURS
Status: DISCONTINUED | OUTPATIENT
Start: 2017-01-01 | End: 2017-01-01

## 2017-01-01 RX ORDER — HYDROMORPHONE HYDROCHLORIDE 1 MG/ML
0.2 INJECTION, SOLUTION INTRAMUSCULAR; INTRAVENOUS; SUBCUTANEOUS EVERY 2 HOUR PRN
Status: DISCONTINUED | OUTPATIENT
Start: 2017-01-01 | End: 2017-01-01

## 2017-01-01 RX ORDER — PROCHLORPERAZINE MALEATE 10 MG
10 TABLET ORAL EVERY 6 HOURS PRN
Status: DISCONTINUED | OUTPATIENT
Start: 2017-01-01 | End: 2017-01-01

## 2017-01-01 RX ORDER — DOCUSATE SODIUM 100 MG/1
100 CAPSULE, LIQUID FILLED ORAL 2 TIMES DAILY
Qty: 60 CAPSULE | Refills: 0 | Status: SHIPPED | OUTPATIENT
Start: 2017-01-01

## 2017-01-01 RX ORDER — MIRTAZAPINE 15 MG/1
15 TABLET, FILM COATED ORAL NIGHTLY
COMMUNITY
End: 2017-01-01

## 2017-01-01 RX ORDER — GABAPENTIN 100 MG/1
100 CAPSULE ORAL NIGHTLY
Status: DISCONTINUED | OUTPATIENT
Start: 2017-01-01 | End: 2017-01-01

## 2017-01-01 RX ORDER — CAPECITABINE 500 MG/1
1000 TABLET, FILM COATED ORAL 2 TIMES DAILY
Status: DISCONTINUED | OUTPATIENT
Start: 2017-01-01 | End: 2017-01-01

## 2017-01-01 RX ORDER — MIRTAZAPINE 15 MG/1
15 TABLET, FILM COATED ORAL NIGHTLY
Qty: 30 TABLET | Refills: 3 | Status: SHIPPED
Start: 2017-01-01 | End: 2017-01-01

## 2017-01-01 RX ORDER — LIDOCAINE 50 MG/G
1 PATCH TOPICAL EVERY 24 HOURS
Qty: 30 PATCH | Refills: 0 | Status: SHIPPED | OUTPATIENT
Start: 2017-01-01 | End: 2018-01-01

## 2017-01-01 RX ORDER — OXYCODONE HYDROCHLORIDE 5 MG/1
5 TABLET ORAL EVERY 4 HOURS PRN
Qty: 90 TABLET | Refills: 0 | Status: SHIPPED | OUTPATIENT
Start: 2017-01-01 | End: 2017-01-01

## 2017-01-01 RX ORDER — SENNOSIDES 8.6 MG
17.2 TABLET ORAL 2 TIMES DAILY
Qty: 120 TABLET | Refills: 0 | Status: SHIPPED | OUTPATIENT
Start: 2017-01-01

## 2017-01-01 RX ORDER — MIRTAZAPINE 15 MG/1
15 TABLET, FILM COATED ORAL NIGHTLY
Qty: 30 TABLET | Refills: 3 | Status: SHIPPED | OUTPATIENT
Start: 2017-01-01 | End: 2017-01-01

## 2017-01-01 RX ORDER — METOCLOPRAMIDE 10 MG/1
10 TABLET ORAL EVERY 6 HOURS PRN
COMMUNITY
End: 2017-01-01

## 2017-01-01 RX ORDER — PANTOPRAZOLE SODIUM 40 MG/1
40 TABLET, DELAYED RELEASE ORAL DAILY
Qty: 30 TABLET | Refills: 0 | Status: SHIPPED | OUTPATIENT
Start: 2017-01-01

## 2017-01-01 RX ORDER — OXYCODONE HYDROCHLORIDE 15 MG/1
15 TABLET, FILM COATED, EXTENDED RELEASE ORAL EVERY 12 HOURS PRN
Qty: 60 TABLET | Refills: 0 | Status: SHIPPED | OUTPATIENT
Start: 2017-01-01 | End: 2017-01-01

## 2017-01-01 RX ORDER — MIRTAZAPINE 15 MG/1
15 TABLET, FILM COATED ORAL
COMMUNITY
Start: 2017-01-01 | End: 2017-01-01

## 2017-01-01 RX ORDER — POTASSIUM CHLORIDE 20 MEQ/1
20 TABLET, EXTENDED RELEASE ORAL DAILY
Qty: 30 TABLET | Refills: 0 | Status: SHIPPED | OUTPATIENT
Start: 2017-01-01 | End: 2017-01-01

## 2017-01-01 RX ORDER — POLYETHYLENE GLYCOL 3350 17 G/17G
17 POWDER, FOR SOLUTION ORAL 2 TIMES DAILY
Qty: 1 BOTTLE | Refills: 0 | Status: SHIPPED | OUTPATIENT
Start: 2017-01-01

## 2017-01-01 RX ORDER — OXYCODONE HYDROCHLORIDE 5 MG/1
5 TABLET ORAL EVERY 4 HOURS PRN
Qty: 60 TABLET | Refills: 0 | Status: SHIPPED | OUTPATIENT
Start: 2017-01-01 | End: 2017-01-01

## 2017-01-01 RX ORDER — CAPECITABINE 500 MG/1
1000 TABLET, FILM COATED ORAL 2 TIMES DAILY
Qty: 56 TABLET | Refills: 2 | Status: SHIPPED | OUTPATIENT
Start: 2017-01-01 | End: 2017-01-01

## 2017-01-01 RX ORDER — MIRTAZAPINE 15 MG/1
15 TABLET, FILM COATED ORAL NIGHTLY
Status: DISCONTINUED | OUTPATIENT
Start: 2017-01-01 | End: 2017-01-01

## 2017-01-01 RX ORDER — OXYCODONE HYDROCHLORIDE AND ACETAMINOPHEN 5; 325 MG/1; MG/1
1 TABLET ORAL
COMMUNITY
Start: 2017-01-01 | End: 2017-01-01

## 2017-01-01 RX ORDER — FAMOTIDINE 20 MG/1
20 TABLET ORAL
COMMUNITY
Start: 2017-03-22 | End: 2017-01-01

## 2017-01-01 RX ORDER — HYDROMORPHONE HYDROCHLORIDE 1 MG/ML
0.8 INJECTION, SOLUTION INTRAMUSCULAR; INTRAVENOUS; SUBCUTANEOUS EVERY 2 HOUR PRN
Status: DISCONTINUED | OUTPATIENT
Start: 2017-01-01 | End: 2017-01-01

## 2017-01-01 RX ORDER — CLINDAMYCIN HYDROCHLORIDE 300 MG/1
300 CAPSULE ORAL 3 TIMES DAILY
Qty: 30 CAPSULE | Refills: 0 | Status: SHIPPED | OUTPATIENT
Start: 2017-01-01 | End: 2017-01-01

## 2017-01-01 RX ORDER — PANTOPRAZOLE SODIUM 40 MG/1
40 TABLET, DELAYED RELEASE ORAL DAILY
Status: DISCONTINUED | OUTPATIENT
Start: 2017-01-01 | End: 2017-01-01

## 2017-01-01 RX ORDER — POLYETHYLENE GLYCOL 3350 17 G/17G
17 POWDER, FOR SOLUTION ORAL 2 TIMES DAILY
Status: DISCONTINUED | OUTPATIENT
Start: 2017-01-01 | End: 2017-01-01

## 2017-01-01 RX ORDER — PALBOCICLIB 100 MG/1
100 CAPSULE ORAL DAILY
COMMUNITY
End: 2017-01-01 | Stop reason: DRUGHIGH

## 2017-01-01 RX ORDER — CAPECITABINE 500 MG/1
1500 TABLET, FILM COATED ORAL 2 TIMES DAILY
Qty: 84 TABLET | Refills: 3 | Status: SHIPPED | OUTPATIENT
Start: 2017-01-01 | End: 2017-01-01

## 2017-01-01 RX ORDER — HYDROMORPHONE HYDROCHLORIDE 1 MG/ML
0.4 INJECTION, SOLUTION INTRAMUSCULAR; INTRAVENOUS; SUBCUTANEOUS EVERY 2 HOUR PRN
Status: DISCONTINUED | OUTPATIENT
Start: 2017-01-01 | End: 2017-01-01

## 2017-01-01 RX ORDER — ZINC SULFATE 50(220)MG
220 CAPSULE ORAL DAILY
Status: DISCONTINUED | OUTPATIENT
Start: 2017-01-01 | End: 2017-01-01

## 2017-01-01 RX ORDER — SENNOSIDES 8.6 MG
17.2 TABLET ORAL 2 TIMES DAILY
Status: DISCONTINUED | OUTPATIENT
Start: 2017-01-01 | End: 2017-01-01

## 2017-01-01 RX ORDER — DEXAMETHASONE 2 MG/1
TABLET ORAL
Qty: 40 TABLET | Refills: 0 | Status: SHIPPED | OUTPATIENT
Start: 2017-01-01 | End: 2017-01-01

## 2017-01-01 RX ORDER — FAMOTIDINE 20 MG/1
20 TABLET ORAL
COMMUNITY
End: 2017-01-01

## 2017-01-01 RX ORDER — FUROSEMIDE 20 MG/1
20 TABLET ORAL DAILY
Qty: 30 TABLET | Refills: 0 | Status: SHIPPED | OUTPATIENT
Start: 2017-01-01 | End: 2017-01-01

## 2017-01-01 RX ORDER — DOCUSATE SODIUM 100 MG/1
100 CAPSULE, LIQUID FILLED ORAL 2 TIMES DAILY
Status: DISCONTINUED | OUTPATIENT
Start: 2017-01-01 | End: 2017-01-01

## 2017-01-01 RX ORDER — PROCHLORPERAZINE MALEATE 10 MG
10 TABLET ORAL EVERY 6 HOURS PRN
Qty: 30 TABLET | Refills: 3 | Status: SHIPPED | OUTPATIENT
Start: 2017-01-01

## 2017-01-01 RX ORDER — OXYCODONE HYDROCHLORIDE 10 MG/1
10 TABLET ORAL EVERY 4 HOURS PRN
Qty: 90 TABLET | Refills: 0 | Status: SHIPPED | OUTPATIENT
Start: 2017-01-01 | End: 2018-01-01

## 2017-01-01 RX ORDER — POTASSIUM CHLORIDE 20 MEQ/1
20 TABLET, EXTENDED RELEASE ORAL DAILY
Status: DISCONTINUED | OUTPATIENT
Start: 2017-01-01 | End: 2017-01-01

## 2017-01-01 RX ORDER — CAPECITABINE 500 MG/1
1000 TABLET, FILM COATED ORAL 2 TIMES DAILY
COMMUNITY
End: 2017-01-01

## 2017-01-01 RX ORDER — SODIUM CHLORIDE 9 MG/ML
INJECTION, SOLUTION INTRAVENOUS ONCE
Status: COMPLETED | OUTPATIENT
Start: 2017-01-01 | End: 2017-01-01

## 2017-01-01 RX ORDER — NICOTINE 21 MG/24HR
1 PATCH, TRANSDERMAL 24 HOURS TRANSDERMAL DAILY
Status: DISCONTINUED | OUTPATIENT
Start: 2017-01-01 | End: 2017-01-01

## 2017-01-01 RX ORDER — OXYCODONE HYDROCHLORIDE AND ACETAMINOPHEN 5; 325 MG/1; MG/1
1 TABLET ORAL EVERY 4 HOURS PRN
COMMUNITY
End: 2017-01-01

## 2017-01-01 RX ORDER — HYOSCYAMINE SULFATE 0.125 MG
0.12 TABLET,DISINTEGRATING ORAL EVERY 4 HOURS PRN
Status: DISCONTINUED | OUTPATIENT
Start: 2017-01-01 | End: 2017-01-01

## 2017-01-01 RX ORDER — ZINC SULFATE 50(220)MG
220 CAPSULE ORAL DAILY
COMMUNITY
End: 2017-01-01

## 2017-01-01 RX ORDER — MIRTAZAPINE 15 MG/1
15 TABLET, FILM COATED ORAL NIGHTLY
Qty: 30 TABLET | Refills: 3 | Status: SHIPPED | OUTPATIENT
Start: 2017-01-01

## 2017-01-01 RX ORDER — OXYCODONE HCL 40 MG/1
40 TABLET, FILM COATED, EXTENDED RELEASE ORAL EVERY 12 HOURS
Status: DISCONTINUED | OUTPATIENT
Start: 2017-01-01 | End: 2017-01-01

## 2017-01-01 RX ADMIN — LAMOTRIGINE 500 MG: 25 TABLET ORAL at 10:45:00

## 2017-01-01 RX ADMIN — LAMOTRIGINE 500 MG: 25 TABLET ORAL at 13:30:00

## 2017-01-01 RX ADMIN — LAMOTRIGINE 500 MG: 25 TABLET ORAL at 11:08:00

## 2017-01-01 RX ADMIN — LAMOTRIGINE 500 MG: 25 TABLET ORAL at 14:15:00

## 2017-01-01 RX ADMIN — LAMOTRIGINE 500 MG: 25 TABLET ORAL at 13:21:00

## 2017-04-11 PROBLEM — C79.51 BONE METASTASES (HCC): Status: ACTIVE | Noted: 2017-01-01

## 2017-04-11 PROBLEM — C78.7 LIVER METASTASES (HCC): Status: ACTIVE | Noted: 2017-01-01

## 2017-04-11 PROBLEM — C50.812 CANCER OF OVERLAPPING SITES OF LEFT FEMALE BREAST (HCC): Status: ACTIVE | Noted: 2017-01-01

## 2017-04-11 NOTE — PROGRESS NOTES
Education Record    Learner:  Patient and Family Member    Disease / Diagnosis:    Barriers / Limitations:  None   Comments:    Method:  Brief focused   Comments:    General Topics:  Medication and Plan of care reviewed   Comments:    Outcome:  Shows under

## 2017-04-12 NOTE — CONSULTS
Cancer Center Report of Consultation    Patient Name: Jean Pierre Melchor   YOB: 1935   Medical Record Number: [id number]   CSN: [id number]   Consulting Physician: Padma Christianson MD  Referring Physician(s): No ref.  provider found  Date of Consultation: Family Medical History:  Family History   Problem Relation Age of Onset   • Ovarian Cancer Maternal Aunt    • Colon Cancer Sister    • Cancer Mother        Gyne History:  Obstetric History    No data available      Psychosocial History:    Social His supraclavicular, axillary, or inguinal regions. Chest: Clear to auscultation. No wheezes or rales. Heart: Regular rate and rhythm. S1S2 normal.  Abdomen: Soft, non tender with good bowel sounds. No hepatosplenomegaly. No palpable mass.   Extremities: No

## 2017-04-14 NOTE — PROGRESS NOTES
Met with patient and explained my role as the breast navigator, explained that the next step is to continue therapy in [place], and if the patient would like to follow up with Dr. Glen Cope then they will call to continue care.  Patient will have follow up imag

## 2017-04-19 NOTE — TELEPHONE ENCOUNTER
Patient's daughter Tyler Tucker called and stated that her mother would like to continue her treatment with Dr. Erica Childress. Patient's next Faslodex is due 4/20 and Dr. Erica Childress ordered this medication and patient is scheduled to come for the injection on 4/20.  Explained t

## 2017-04-20 NOTE — PROGRESS NOTES
Pt arrived for faslodex injection, pt denies any changes in adverse S/S, pt alert and appears in nad, pt unable to ambulate except for very short distances, pt uses cane and wheel chair for mobility, pt recently moved to the area from [place] and started treatm

## 2017-04-25 NOTE — TELEPHONE ENCOUNTER
Patient's daughter phoned and was inquiring about the next steps. Assisted patient with ordering and scheduling CT scan for May 8th. Also explained that patient should continue with the Zometa.  Patient last received the Zometa the first week of April so th

## 2017-04-25 NOTE — TELEPHONE ENCOUNTER
Call placed to [facility], patient's daughter at the request of Riaz Servin, the breast navigator. Patient has been using percocet 5mg every 4 hours with some benefit. She has adhesive allergy and couldn't tolerate fentanyl patch. Oxycontin caused sedation.

## 2017-04-26 NOTE — PATIENT INSTRUCTIONS
For constipationm:    Senna 2 tablets twice daily  miralax twice daily  Colace daily  Prune jjuice  Fruit and vegetables

## 2017-04-26 NOTE — PROGRESS NOTES
Palliative Care Consult Note:      Patient Name: Magali Search   YOB: 1935   Medical Record Number: [id number]   CSN: [id number]   Date of visit: 4/26/2017     Reason for Consult:  Uncontrolled pain      Chief Complaint/Reason for Visit:  Ashly Orourke Surgical History    LUMPECTOMY LEFT      HYSTERECTOMY         Allergies:    Tramadol                Nausea and vomiting    Family History:  Family History   Problem Relation Age of Onset   • Ovarian Cancer Maternal Aunt    • Colon Cancer Sister    • Cancer nightly., Disp: , Rfl:   •  mirtazapine 15 MG Oral Tab, Take 15 mg by mouth nightly., Disp: , Rfl:     Review of Systems:  General:  Fatigue. Feels well.     Respiratory:  Denies SOB, denies cough  Cardiac:  Denies chest pain, heart palpitations  Abdomen: controlled. Patient will trial prn use. Patient will benefit from a goals of care discussion in the future once pain is better controlled. Impression/Plan:   Pain  1.  oxycontin 10mg Q 12  2. Oxycodone IR 5mg Q 4 prn  3.   Dexamethasone 4mg daily

## 2017-05-08 NOTE — PROGRESS NOTES
Palliative Care Follow up Note    Patient Name: Jess Grullon   YOB: 1935   Medical Record Number: [id number]   CSN: [id number]   Date of visit: 5/8/2017       Chief Complaint/Reason for Visit:  Palliative care follow up     History of Present I by mouth nightly., Disp: 30 tablet, Rfl: 3  •  famoTIDine 20 MG Oral Tab, Take 20 mg by mouth nightly., Disp: , Rfl:   •  OxyCODONE HCl ER 10 MG Oral Tablet Extended Release 12 hour Abuse-Deterrent, Take 1 tablet (10 mg total) by mouth 2 (two) times daily. mood and appetite and is doing well with it. I will continue this since it has been beneficial with little SE. Will discontinue dexamethasone since she has weight gain and increased appetite.   Pain is improved, encouraged her to use BTP oxycodone as need

## 2017-05-08 NOTE — PROGRESS NOTES
Copy of the appointment for follow up with Dr. Amna Granados mailed to patient's daughter as requested.

## 2017-05-08 NOTE — PROGRESS NOTES
Education Record    Learner:  Patient    Disease / Diagnosis: Breast Ca with Bone Metastasis    Barriers / Limitations:  None   Comments:    Method:  Brief focused and Reinforcement   Comments:    General Topics:  Side effects and symptom management, Plan

## 2017-05-11 NOTE — PROGRESS NOTES
Nutrition screen complete as triggered by Best Practice dx of liver metastasis. Chart reviewed. Pt appears at a mild to moderate nutrition risk at present. RD available on consult.

## 2017-05-19 NOTE — PROGRESS NOTES
Cancer Center Progress Note    Problem List:      Patient Active Problem List:     Bone metastases [facility])     Liver metastases ([facility] [address]     Cancer of overlapping sites of left female breast [facility])      Interim History:    The patient returns for management of h Diffuse osseous metastatic deposits are again noted. These may be increased when compared to prior exam. There is interval superior endplate compression deformity of L4. Mild compression deformity of L5 is stable. T12 compression deformity is stable.  Scler docusate sodium 100 MG Oral Cap Take 1 capsule (100 mg total) by mouth 2 (two) times daily. Disp: 60 capsule Rfl: 0   famoTIDine 20 MG Oral Tab Take 20 mg by mouth nightly.  Disp:  Rfl:          Vital Signs:      Height: --  Weight: 58.06 kg (128 lb) (05/

## 2017-06-02 NOTE — PROGRESS NOTES
Education Record    Learner:  Patient    Disease / Bebe Rodriguez cancer    Barriers / Limitations:  None    Method:  Brief focused, printed material and  reinforcement    General Topics:  Plan of care reviewed    Outcome:  Shows understanding

## 2017-06-16 NOTE — PROGRESS NOTES
Cancer Center Progress Note    Problem List:      Patient Active Problem List:     Bone metastases [facility])     Liver metastases ([facility] [address]     Cancer of overlapping sites of left female breast [facility])      Interim History:    The patient returns for management of h 0   Senna 8.6 MG Oral Tab Take 2 tablets (17.2 mg total) by mouth 2 (two) times daily. Disp: 120 tablet Rfl: 0   docusate sodium 100 MG Oral Cap Take 1 capsule (100 mg total) by mouth 2 (two) times daily.  Disp: 60 capsule Rfl: 0   Metoclopramide HCl 10 MG the current support meds. CPM. She will return in four weeks with labs. She will continue with Zometa monthly. I will see her in four weeks.     Shanon Jorgensen MD

## 2017-06-16 NOTE — PATIENT INSTRUCTIONS
To reach Dr Deepak Eden nurse during business hours, please call [phone]. After hours, including weekends, evenings, and holidays, please call the main number [phone] for emergent needs.

## 2017-06-16 NOTE — PROGRESS NOTES
Palliative Care Follow up Note    Patient Name: Jossue Michaud   YOB: 1935   Medical Record Number: [id number]   CSN: [id number]   Date of visit: 6/16/2017       Chief Complaint/Reason for Visit:  Follow up     History of Present Illness:   81y o Cap, Take 100 mg by mouth daily. , Disp: , Rfl:   •  mirtazapine 15 MG Oral Tab, Take 1 tablet (15 mg total) by mouth nightly., Disp: 30 tablet, Rfl: 3  •  OxyCODONE HCl 5 MG Oral Tab, Take 1 tablet (5 mg total) by mouth every 4 (four) hours as needed for P total minutes spent with patient >90% of visit was spent in counseling and coordination of care for symptom management.         Electronically Signed by:  ROLLY Gilliland, NABILA-MINH Hobson Outpatient Palliative Nurse Practitioner

## 2017-06-16 NOTE — PROGRESS NOTES
Education Record    Learner:  Patient    Disease / Randle Mortimer cancer    Barriers / Limitations:  None    Method:  Brief focused, printed material and  reinforcement    General Topics:  Plan of care reviewed    Outcome:  Shows understanding

## 2017-06-22 NOTE — TELEPHONE ENCOUNTER
Pt's daughter would like to know results for most recent cbc and Ibrance instructions. She was informed to hold Ibrance for another week and to restart Ibrance 75 mg in one week per MD Medina.  CBC results given to patient's daughter

## 2017-06-30 NOTE — TELEPHONE ENCOUNTER
Pt's daughter was informed to hold Ibrance for 1 week and start lasix and kdur per MD Medina. She was informed to call us back in one week with an update.

## 2017-07-05 NOTE — TELEPHONE ENCOUNTER
Pt's ankles continue to be swollen. No better. She is taking Lasix and potassium. She is holding the ibrance. Dr. Kyler Hall informed. No new orders at this time -- call on Friday with an update. Pt's dtr informed and verbalized understanding.

## 2017-07-07 NOTE — TELEPHONE ENCOUNTER
Patient's daughter calling with concerns that Randolph Guerrero continues to have swelling despite being on Lasix 20 mg daily x 1 week. Swelling is better after being in bed all night, but quickly returns. No redness, heat or soreness with the swelling.  Patient has been

## 2017-07-07 NOTE — PROGRESS NOTES
ANP Visit Note    Patient Name: Roland Wiley   YOB: 1935   Medical Record Number: [id number]   CSN: [id number]   Date of visit: 7/7/2017       Chief Complaint/Reason for Visit:  Metastatic Breast Cancer, Bilateral LE edema      History of Presen total) by mouth daily. , Disp: 30 tablet, Rfl: 0  •  Potassium Chloride ER 20 MEQ Oral Tab CR, Take 1 tablet (20 mEq total) by mouth daily. , Disp: 30 tablet, Rfl: 0  •  Palbociclib 75 MG Oral Cap, Take 1 capsule (75 mg total) by mouth daily. , Disp: 21 capsu lymphadenopathy  Psych/Depression: mood and affect are appropriate.      Labs:  Patient Active Problem List  WBC                                           Date: 06/22/2017  Value: 1.5*        Ref range: 4.0 - 13.0 x10(3*  Status: Final  RBC Date: 06/22/2017  Value: 0.04        Ref range: 0.00 - 0.30 x10(*  Status: Final  Basophil Absolute                             Date: 06/22/2017  Value: 0.01        Ref range: 0.00 - 0.10 x10(*  Status: Final  Immature Granulocyte Absolute Group

## 2017-07-10 NOTE — TELEPHONE ENCOUNTER
Central scheduling called and states the next available appt in [place] for an echo is August 2. Spoke to Alexsander at [phone] -- appt available today in [place] at 1 pm.  Nothing available in [place] today or Friday. Pt's daughter informed.   She kasie

## 2017-07-14 NOTE — PROGRESS NOTES
Cancer Center Progress Note    Problem List:      Patient Active Problem List:     Bone metastases [facility])     Liver metastases ([facility] [address]     Cancer of overlapping sites of left female breast ([facility] [address]      Interim History:    She developed ankle edema.  I stopped th Extended Release 12 hour Abuse-Deterrent Take 1 tablet (10 mg total) by mouth 2 (two) times daily. Disp: 60 tablet Rfl: 0   zinc sulfate 220 (50 Zn) MG Oral Cap Take 220 mg by mouth daily.  Disp:  Rfl:    mirtazapine 15 MG Oral Tab Take 1 tablet (15 mg tota 06/16/2017   K 4.0 06/16/2017    06/16/2017   CO2 25.0 06/16/2017   BUN 16 06/16/2017   CREATSERUM 1.05 (H) 06/16/2017   GLU 85 06/16/2017   CA 8.7 06/16/2017   ALKPHO 56 06/16/2017   ALT 9 (L) 06/16/2017   AST 12 (L) 06/16/2017   BILT 0.3 06/16/2017

## 2017-07-14 NOTE — PROGRESS NOTES
Education Record    Learner:  Patient    Disease / Francisco Offer cancer    Barriers / Limitations:  None    Method:  Brief focused, printed material and  reinforcement    General Topics:  Plan of care reviewed    Outcome:  Shows understanding

## 2017-08-04 PROBLEM — C50.812 MALIGNANT NEOPLASM OF OVERLAPPING SITES OF LEFT BREAST IN FEMALE, ESTROGEN RECEPTOR POSITIVE (HCC): Status: ACTIVE | Noted: 2017-01-01

## 2017-08-04 PROBLEM — Z17.0 MALIGNANT NEOPLASM OF OVERLAPPING SITES OF LEFT BREAST IN FEMALE, ESTROGEN RECEPTOR POSITIVE (HCC): Status: ACTIVE | Noted: 2017-01-01

## 2017-08-04 NOTE — PROGRESS NOTES
Cancer Center Progress Note    Problem List:      Patient Active Problem List:     Bone metastases [facility])     Liver metastases ([facility] [address]     Malignant neoplasm of overlapping sites of left breast in female, estrogen receptor positive ([facility] [address]      Interim History: mg by mouth daily. Disp:  Rfl:    mirtazapine 15 MG Oral Tab Take 1 tablet (15 mg total) by mouth nightly. Disp: 30 tablet Rfl: 3   OxyCODONE HCl 5 MG Oral Tab Take 1 tablet (5 mg total) by mouth every 4 (four) hours as needed for Pain.  Disp: 90 tablet Rfl ALKPHO 52 (L) 08/04/2017   ALT 15 08/04/2017   AST 21 08/04/2017   BILT 0.3 08/04/2017   ALB 3.4 (L) 08/04/2017   TP 6.1 08/04/2017     Lab Component   Component Value Date/Time    Breast Carcinoma Ag [id number] 152.4 (H) 07/14/2017 0934          Impression:

## 2017-08-14 NOTE — PROGRESS NOTES
Education Record    Learner:  Patient    Disease / Digna Erb cancer    Barriers / Limitations:  None    Method:  Brief focused, printed material and  reinforcement    General Topics:  Plan of care reviewed    Outcome:  Shows understanding

## 2017-09-01 NOTE — PROGRESS NOTES
Cancer Center Progress Note    Problem List:      Patient Active Problem List:     Bone metastases [facility])     Liver metastases ([facility] [address]     Malignant neoplasm of overlapping sites of left breast in female, estrogen receptor positive ([facility] [address]      Interim History: cm. A dominant lesion in the medial left hepatic lobe extending into the anterior right hepatic lobe measures 3.9 x 3.7 cm as compared to 2.9 x 2.4 cm on the previous exam. Remaining scattered metastatic foci are stable to slightly smaller in overall size. stable, and some decreasing in size. 2. Relatively stable diffuse osseous metastatic disease as above would be better assessed with nuclear medicine bone scan.      3. Minimal increase in size of a 6 mm nodule in the left lower lobe could represent prog 2 (two) times daily.  Disp: 60 capsule Rfl: 0         Vital Signs:      Height: --  Weight: 55.9 kg (123 lb 3.2 oz) (09/01 1100)  BSA (Calculated - sq m): --  Pulse: 75 (09/01 1100)  BP: 111/65 (09/01 1100)  Temp: 98.6 °F (37 °C) (09/01 1100)  Do Not Use - I had a long discussion with the patient about options. We discussed the option of oral chemotherapy with capecitabine. I discussed the risks of hand/foot syndrome, nausea, diarrhea, mucositis, low blood counts and fatigue.  I also discussed the option of p

## 2017-09-11 PROBLEM — Z71.89 OTHER SPECIFIED COUNSELING: Status: ACTIVE | Noted: 2017-01-01

## 2017-09-11 NOTE — PROGRESS NOTES
ORAL CHEMOTHERAPY EDUCATION RECORD  Learner:  Patient  Barriers / Limitations:  None    Diagnosis:   Breast cancer  Chemo Agents / Protocol:   Xeloda   Medication Name:   Xeloda  Dose:  1500mg       Frequency:  BID 7 days on, 7 days off    Administration Sheet  Side Effect Management Information Sheet  [place] Information sheet    Patient and caregiver were given ample opportunity to ask questions. All questions and concerns addressed.   We discussed self care tech

## 2017-09-12 PROBLEM — Z51.5 PALLIATIVE CARE BY SPECIALIST: Status: ACTIVE | Noted: 2017-01-01

## 2017-09-12 PROBLEM — Z71.89 GOALS OF CARE, COUNSELING/DISCUSSION: Status: ACTIVE | Noted: 2017-01-01

## 2017-09-12 NOTE — PROGRESS NOTES
Palliative Care Follow up Note    Patient Name: Brittany Hyman   YOB: 1935   Medical Record Number: [id number]   CSN: [id number]   Date of visit: 9/12/2017       Chief Complaint/Reason for Visit:  Palliative care follow up for pain and goals OxyCODONE HCl ER 10 MG Oral Tablet Extended Release 12 hour Abuse-Deterrent, Take 1 tablet (10 mg total) by mouth 2 (two) times daily. , Disp: 60 tablet, Rfl: 0  •  Prochlorperazine Maleate (COMPAZINE) 10 mg tablet, Take 1 tablet (10 mg total) by mouth ever Sleeping well      Palliative Performance Scale:  70%       Physical Examination:   General: alert and oriented, not in acute distress. Respiratory: Clear to auscultation. Cardiac: Regular rate and rhythm.    No edema  Abdomen: Soft, non tender with good

## 2017-09-15 NOTE — PROGRESS NOTES
Cancer Center Progress Note    Problem List:      Patient Active Problem List:     Bone metastases [facility])     Liver metastases ([facility] [address]     Malignant neoplasm of overlapping sites of left breast in female, estrogen receptor positive ([facility] [address]     Other specified c Disp: 120 tablet Rfl: 0   docusate sodium 100 MG Oral Cap Take 1 capsule (100 mg total) by mouth 2 (two) times daily.  Disp: 60 capsule Rfl: 0         Vital Signs:      Height: --  Weight: 56 kg (123 lb 6.4 oz) (09/15 0920)  BSA (Calculated - sq m): --  Pul week of capecitabine. We will continue this with 7 days on and 7 days off. She is taking 1500 mg BID. She was instructed to call with any hand/foot complaints or diarrhea. I will see her in four weeks. She will continue with Zometa monthly.     Mild norm

## 2017-09-18 NOTE — TELEPHONE ENCOUNTER
Pt's daughter states patient took her dose of Xeloda at 8 am.  At 11:30 am she had bad gas pains. She threw up 1 time. She is now laying down. Instructed she may take over the counter medication for gas and she may take Zofran.   Call if no improvement

## 2017-09-25 NOTE — TELEPHONE ENCOUNTER
Refilled mirtazapine to [facility]. Daughter also reports patient is having some mild stomatitis from chemotherapy. She is able to eat and drink with some discomfort. Will trial magic mouthwash for comfort.

## 2017-09-26 NOTE — TELEPHONE ENCOUNTER
Pt woke up at 5 am with severe pain to her right shoulder. She rates her pain at a 10. She has not had this pain before. She cannot move her arm without severe pain. Pt to see APN today.

## 2017-09-26 NOTE — PROGRESS NOTES
ANP Visit Note    Patient Name: Crescencio Reddy   YOB: 1935   Medical Record Number: [id number]   CSN: [id number]   Date of visit: 9/26/2017       Chief Complaint/Reason for Visit:  Follow up chemo / Breast cancer      History of Present Illnes History  None on file     Social History Main Topics   Smoking status: Current Every Day Smoker  1.00 Packs/day  For 50.00 Years     Types: Cigarettes    Smokeless tobacco: Never Used    Alcohol use Yes  0.0 oz/week     Drug use: No    Sexual activity: Not Take 1 capsule (100 mg total) by mouth 2 (two) times daily. , Disp: 60 capsule, Rfl: 0  •  Prochlorperazine Maleate (COMPAZINE) 10 mg tablet, Take 1 tablet (10 mg total) by mouth every 6 (six) hours as needed for Nausea., Disp: 30 tablet, Rfl: 3  •  furosem (H)   RDW Latest Ref Range: 11.5 - 16.0 % 14.1   RDW-SD Latest Ref Range: 35.1 - 46.3 fL 50.8 (H)   Prelim Neutrophil Abs Latest Ref Range: 1.30 - 6.70 x10 (3) uL 5.44   Neutrophils Absolute Latest Ref Range: 1.30 - 6.70 x10(3) uL 5.44   Lymphocytes Absolu

## 2017-09-29 NOTE — PROGRESS NOTES
ANP Visit Note    Patient Name: Glenn Lyles   YOB: 1935   Medical Record Number: [id number]   CSN: [id number]   Date of visit: 9/29/2017       Chief Complaint/Reason for Visit:  Metastatic Breast Cancer, Right shoulder pain, Right arm swell Occupational History  None on file     Social History Main Topics   Smoking status: Current Every Day Smoker  1.00 Packs/day  For 50.00 Years     Types: Cigarettes    Smokeless tobacco: Never Used    Alcohol use Yes  0.0 oz/week     Drug use: No    Sex Rfl: 0  •  Potassium Chloride ER 20 MEQ Oral Tab CR, Take 1 tablet (20 mEq total) by mouth daily. , Disp: 30 tablet, Rfl: 0  •  zinc sulfate 220 (50 Zn) MG Oral Cap, Take 220 mg by mouth daily. , Disp: , Rfl:   •  OxyCODONE HCl 5 MG Oral Tab, Take 1 tablet ( - 8.0 mg/dL    Status: Final  Glucose                                       Date: 09/26/2017  Value: 128*        Ref range: 70 - 99 mg/dL      Status: Final  BUN                                           Date: 09/26/2017  Value: 16          Ref range: 8 - Final  WBC                                           Date: 09/26/2017  Value: 6.5         Ref range: 4.0 - 13.0 x10(3*  Status: Final  RBC                                           Date: 09/26/2017  Value: 3.28*       Ref range: 3.80 - 5.10 x10(*  Status: Date: 09/26/2017  Value: 0.01        Ref range: 0.00 - 0.10 x10(*  Status: Final  Immature Granulocyte Absolute                 Date: 09/26/2017  Value: 0.02        Ref range: 0.00 - 1.00 x10(*  Status: Final  Neutrophil % 11: 54       Approved by: Rickie Hernandez MD         Impression/Plan:  1. Metastatic Breast Cancer: will hold Xeloda  2. Right shoulder pain: will start Dex 4 mg BID for 2 days then decrease to 2 mg BID, referral to RT for palliative RT.     Emotional Well Be

## 2017-10-02 NOTE — TELEPHONE ENCOUNTER
Pt's daughter states patient vomited 3x last night. Denies nausea. Denies constipation. She states patient had pains in her stomach and intestine after eating. This has happened 4 - 5 times in the past 2 weeks. She is taking Protonix.    Dr. Fredrick Bryson  inf

## 2017-10-04 NOTE — PROGRESS NOTES
Nursing Consultation Note  Patient: Daphney Boo  YOB: 1935  Age: 81year old  Radiation Oncologist: Dr. Jacklyn Caro  Referring Physician: Dr. Aubrey Glover  Diagnosis: BREAST CA WITH BONE METASTASIS  Consult Date: 10/4/2017    History o vomiting  Fentanyl Citrate [F*    Rash    Comment:Fentanyl patch caused localized rash around patch             site.      Chemotherapy: Xeloda on hold, last chemo taken last week (9/29/17)    Previous Radiation: s/p L breast RT in 2012 (in [place]), then RT to total) by mouth 2 (two) times daily. Disp: 60 capsule Rfl: 0   maalox/diphenhydramine/lidocaine viscous Oral Suspension Take 5 mL by mouth 4 (four) times daily before meals and nightly.  Disp: 480 mL Rfl: 3   Prochlorperazine Maleate (COMPAZINE) 10 mg table oz)  09/06/17 : 55.9 kg (123 lb 3.2 oz)  08/04/17 : 58.5 kg (128 lb 14.4 oz)

## 2017-10-05 NOTE — PROGRESS NOTES
Cancer Center Progress Note    Problem List:      Patient Active Problem List:     Bone metastases [facility])     Liver metastases ([facility] [address]     Malignant neoplasm of overlapping sites of left breast in female, estrogen receptor positive ([facility] [address]     Other specified c needed for Nausea. Disp: 30 tablet Rfl: 3   zinc sulfate 220 (50 Zn) MG Oral Cap Take 220 mg by mouth daily. Disp:  Rfl:    Senna 8.6 MG Oral Tab Take 2 tablets (17.2 mg total) by mouth 2 (two) times daily.  Disp: 120 tablet Rfl: 0   docusate sodium 100 MG Carcinoma Ag [id number] 274.1 (H) 09/15/2017 0920          Impression:     Metastatic breast cancer diagnosed in 2/2017  Bone metastases  Liver metastases  S/P Faslodex/Palbo with progression in 8/2017  Started Capecitabine on 9/11/2017    She is doing well on

## 2017-10-05 NOTE — PROGRESS NOTES
Pt here for follow up. She is not taking her xeloda at this time. Pt complains of upper and lower abdominal pain. Pt states she does not have a good appetite.

## 2017-10-06 NOTE — TELEPHONE ENCOUNTER
Patient's daughter was informed that tumor marker 242.3.  She was informed to continue with PET/CT per MD WELLS [facility]

## 2017-10-06 NOTE — CONSULTS
[facility]    PATIENT'S NAME: Candace White   RADIATION ONCOLOGIST: Sean Adams M.D.    PATIENT ACCOUNT #: [de-identified] [facility]   MEDICAL RECORD #: [id number] YOB: 1935   CONSULTATION DATE: 10/04/2017       RADIA was performed in the [facility]. She also then received Ibrance. The patient elected to move to [place] to be with her daughter. Dr. Trupti Muñoz took over her care. She re-initiated Ibrance, Faslodex, and monthly Zometa.   However, she progressed and Dulcolax, Maalox, Compazine, Xeloda on hold, furosemide, and potassium. ALLERGIES:  Tramadol and fentanyl. RADIATION THERAPY/CHEMOTHERAPY:  Currently, Xeloda is on hold.   Previous radiation:  Left breast irradiation, sacrum, bilateral SI joint, bilat pain at this time and declined radiation therapy. She is more concerned about her abdominal pain which she will follow up with Dr. Willem Burns. I have asked her to return if her right shoulder pain worsens.   The rationale for palliative bone irradiation, its po

## 2017-10-11 NOTE — TELEPHONE ENCOUNTER
From: Valerie Hamilton  To: ROLLY Cordoba  Sent: 10/11/2017 8:43 AM CDT  Subject: Prescription Question    The sleeping pills are not working any longer. I am currently on Mirtazapine 15 mg oral tab. I am waking too many times a night.     The Oxycodone [facility]

## 2017-10-11 NOTE — TELEPHONE ENCOUNTER
Spoke with Denis Parents, patients daughter, regarding pain. She has been using 2-3 doses of percocet daily and patient has been doubling her oxycontin at night for pain. She is more fatigued and sleeping more.   She states her mom complains of low back and pelvi

## 2017-10-13 NOTE — PROGRESS NOTES
Palliative Care Follow up Note    Patient Name: Jenaro Chappell   YOB: 1935   Medical Record Number: [id number]   CSN: [id number]   Date of visit: 10/13/2017       Chief Complaint/Reason for Visit:  Palliative follow up     History of Present retired and        Medications:    Current Outpatient Prescriptions:   •  OxyCODONE HCl ER (OXYCONTIN) 15 MG Oral Tablet Extended Release 12 hour Abuse-Deterrent, Take 1 tablet (15 mg total) by mouth every 12 (twelve) hours as needed for Pain., Disp Disp: 120 tablet, Rfl: 0  •  docusate sodium 100 MG Oral Cap, Take 1 capsule (100 mg total) by mouth 2 (two) times daily. , Disp: 60 capsule, Rfl: 0    Review of Systems:   General:  Fatigue. Feels well.     Respiratory:  Denies SOB, denies cough  Cardiac:

## 2017-10-13 NOTE — PROGRESS NOTES
Cancer Center Progress Note    Problem List:      Patient Active Problem List:     Bone metastases [facility])     Liver metastases (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 [address]     Other specified c sacrum with SUV measuring 6.7 as well as numerous areas within the thoracic and lumbar spine. A representative lesion in the posterior aspect of the L4 vertebral body is noted.  Additional scattered foci are present within the proximal right femur and throu the prior exam.       Review of Systems:   Constitutional: Negative for anorexia, chills, fevers, night sweats and weight loss. Respiratory: Negative for cough, hemoptysis, chest pain, or dyspnea on exertion.   Gastrointestinal: Negative for nausea, vomiti 15.6 10/13/2017   .0 (L) 10/13/2017       Lab Results  Component Value Date    10/13/2017   K 3.5 (L) 10/13/2017    10/13/2017   CO2 25.0 10/13/2017   BUN 21 (H) 10/13/2017   CREATSERUM 1.28 (H) 10/13/2017   GLU 83 10/13/2017   CA 9.1 10

## 2017-10-27 NOTE — PROGRESS NOTES
Cancer Center Progress Note    Problem List:      Patient Active Problem List:     Bone metastases [facility])     Liver metastases ([facility] [address]     Malignant neoplasm of overlapping sites of left breast in female, estrogen receptor positive ([facility] [address]     Other specified c right lateral aspect of the sacrum with SUV measuring 6.7 as well as numerous areas within the thoracic and lumbar spine. A representative lesion in the posterior aspect of the L4 vertebral body is noted.  Additional scattered foci are present within the pr collecting system which is new from the prior exam.       Review of Systems:   Constitutional: Negative for anorexia, chills, fevers, night sweats and weight loss. Respiratory: Negative for cough, hemoptysis, chest pain, or dyspnea on exertion.   [employer] --  SpO2: 98 % (10/27 1424)    Performance Status:  ECOG 2: Ambulatory and capable of all selfcare but unable to carry out any work activities.  Up and about more than 50% of waking hours     Physical Examination:    General: Patient is alert and not in acu hold off with this. The patient is not sure that she wants to be this aggressive. We will repeat the CMP in four weeks. She was getting Zometa monthly for her bone metastases.  We will continue to hold off with this with her increased creatinine and decr

## 2017-11-02 NOTE — TELEPHONE ENCOUNTER
Patient's daughter states the patient reported pain to the R knee and R foot. She states pain has bee increasing and the patient has some edema to the R leg. Dr Roshan Quigley informed.  Patient was schedule for US venous doppler and APN assessment per MD order

## 2017-11-02 NOTE — PROGRESS NOTES
CC:Patient presents with: Follow - Up: swelling right leg/knee    Patient's daughter states the patient reported pain to the R knee and R foot. She states pain has bee increasing and the patient has some edema to the R leg. Dr Lloyd Beckham informed.  Patient was s Lymphatics: There is no palpable lymphadenopathy throughout in the cervical,            supraclavicular, axillary, or inguinal regions.   Psych/Depression: normal    LABS  Reviewed labs from 10/27     IMAGING  PROCEDURE:  ULTRASOUND VENOUS BLOOD FLOW OF T the patient and family,       90 % of that time was spent addressing the patients emotional well being, plan of care,  Reviewing lab/test results.   Patient/family verbalized understanding of plan of care        ROLLY Malhotra  11/2/2017

## 2017-11-09 NOTE — PROGRESS NOTES
HPI:    Patient ID: Shakila Tsang is a 81year old female.     HPI    Review of Systems         Current Outpatient Prescriptions:  OxyCODONE HCl ER (OXYCONTIN) 15 MG Oral Tablet Extended Release 12 hour Abuse-Deterrent Take 1 tablet (15 mg total) by mouth Rash    Comment:Fentanyl patch caused localized rash around patch             site. PHYSICAL EXAM:   Physical Exam           ASSESSMENT/PLAN:   No diagnosis found. No orders of the defined types were placed in this encounter.       Meds This Visit:  Si

## 2017-11-10 NOTE — PROGRESS NOTES
Education Record  Learner:  Patient  Disease / Diagnosis:   Metastatic breast  Barriers / Limitations:  None  Method:  Printed material and Reinforcement  General Topics:  Plan of care reviewed; labs; meds; increasing fluids  Outcome:  Shows understanding

## 2017-11-11 NOTE — CM/SW NOTE
Emergency Department Discharge Plan    Cherylene Larry is a 81year old female who presented to the ED with DVT. ED Case Manager was asked to assist in arranging for home anticoagulation. Prescription for Xarelto was phoned into patient's pharmacy.   Kaylie Lauren

## 2017-11-11 NOTE — ED PROVIDER NOTES
Patient Seen in: [facility] Emergency Department In [place]    History   Patient presents with:  Swelling Edema (cardiovascular, metabolic)    Stated Complaint: right leg swollen and painful    HPI    81year-old female presents with pain, redness, swelli acute distress   HEENT: Sclerae anicteric. Conjunctivae show no pallor. Oropharynx clear, mucous membranes moist   Neck: supple, no rigidity   Lungs: good air exchange and clear   Heart: regular rate rhythm and no murmur   Abdomen: Soft and nontender.   Delfino Pear DIFFERENTIAL[[id number]]          Abnormal            Final result                 Please view results for these tests on the individual orders.        ED Course as of Nov 11 1351  ------------------------------------------------------------  Xr Ankle (min 3 phasicity, and augmentation. OTHER:   Incidental note is made of a bowel-containing right inguinal hernia along the right groin. Nonspecific soft tissue edema/fluid is noted along the focal area of pain at the level of the right heel.        CONCLUSION: vein thrombosis (DVT) of distal vein of left lower extremity ([address]  (primary encounter diagnosis)  Cellulitis of right lower extremity    Disposition:  Discharge  11/11/2017  1:51 pm    Follow-up:  MD Annie Dumont 65

## 2017-11-11 NOTE — ED INITIAL ASSESSMENT (HPI)
Some swelling to r foot over last 3 days- awoke this am with increased swelling and pain to r foot-- -

## 2017-11-13 PROBLEM — L03.116 CELLULITIS OF LEFT ANKLE: Status: ACTIVE | Noted: 2017-01-01

## 2017-11-13 PROBLEM — I82.442 ACUTE DEEP VEIN THROMBOSIS (DVT) OF TIBIAL VEIN OF LEFT LOWER EXTREMITY (HCC): Status: ACTIVE | Noted: 2017-01-01

## 2017-11-13 PROBLEM — Z86.718 ENCOUNTER FOR FOLLOW-UP OF ACUTE DEEP VEIN THROMBOSIS (DVT) OF LEFT LOWER EXTREMITY: Status: ACTIVE | Noted: 2017-01-01

## 2017-11-13 PROBLEM — L03.115 CELLULITIS OF RIGHT ANKLE: Status: ACTIVE | Noted: 2017-01-01

## 2017-11-13 PROBLEM — Z09 ENCOUNTER FOR FOLLOW-UP OF ACUTE DEEP VEIN THROMBOSIS (DVT) OF LEFT LOWER EXTREMITY: Status: ACTIVE | Noted: 2017-01-01

## 2017-11-13 NOTE — PROGRESS NOTES
[facility] Progress Note    Patient Name: Grecia Youngblood   YOB: 1935   Medical Record Number: [id number]   CSN: [id number]   Date of visit: 11/13/2017   Provider: ROLLY Lazaro  Referring Physician: No ref.  provider found    Proble CP.  No hemoptysis. No fainting spells. Of note, she has a R subconjunctival hemorrhage since starting Xarelto. She denies vision impairment in the R eye but notes a \"gritty\" sensation in the eye. No bleeding elsewhere.   She had some diarrhea for 2 d every 6 (six) hours as needed for Nausea., Disp: 30 tablet, Rfl: 3  •  furosemide (LASIX) 20 MG Oral Tab, Take 1 tablet (20 mg total) by mouth daily. , Disp: 30 tablet, Rfl: 0  •  Potassium Chloride ER 20 MEQ Oral Tab CR, Take 1 tablet (20 mEq total) by mauricio with Dr. Paige Dover. Pt to follow up in 3 days to reevaluate whether Xeloda can be resumed depending on her overall status. 5.  Pain:  Chronic pain secondary to 4, and pain now with R ankle/foot celllulitis. CPM with Oxycodone IR and Oxycontin.       A total

## 2017-11-16 NOTE — PROGRESS NOTES
CC:Patient presents with: Follow - Up: follow up on right ankle cellulitis      HPI:   Lenny Gill is a(n) 81year old female followed by Dr. Roshan Quigley for metastatic breast cancer. She is currently on xeloda but this is on hold right now due to infection. thumbs bilateral    LABS  Results for Hi Coleman (MRN [id number]) as of 11/16/2017 12:35   Ref.  Range 11/11/2017 11:04   Glucose Latest Ref Range: 70 - 99 mg/dL 93   Sodium Latest Ref Range: 136 - 144 mmol/L 142   Potassium Latest Ref Range: 3.6 - 5.1 0.00 - 1.00 x10(3) uL 0.02   Neutrophils % Latest Units: % 78.9   Lymphocytes % Latest Units: % 8.4   Monocytes % Latest Units: % 11.4   Eosinophils % Latest Units: % 0.5   Basophils % Latest Units: % 0.3   Immature Granulocyte % Latest Units: % 0.5   SED

## 2017-11-22 NOTE — PROGRESS NOTES
Cancer Center Progress Note    Problem List:      Patient Active Problem List:     Bone metastases [facility])     Liver metastases ([facility] [address]     Malignant neoplasm of overlapping sites of left breast in female, estrogen receptor positive ([facility] [address]     Other specified c involvement of the right pedicle and left lamina as well as of the spinous process. There is a sclerotic lesion within the right proximal humerus in the region of the neck. This demonstrates SUV measuring 7.1.  Focal increased uptake within a right lateral including numerous vertebral bodies, the pelvis, proximal right femur, right humerus, and likely the right lateral sixth rib. The osseous metastatic disease appears grossly stable.       There are several scattered pulmonary micronodules for which continued tablet Rfl: 0         Vital Signs:      Height: 157.5 cm (5' 2.01\") (11/22 1114)  Weight: 56.5 kg (124 lb 8 oz) (11/22 1114)  BSA (Calculated - sq m): 1.56 sq meters (11/22 1114)  Pulse: 82 (11/22 1114)  BP: 119/57 (11/22 1114)  Temp: 97 °F (36.1 °C) (11/ restarting this next week. She does have some increased sacral pain. I recommended that we have her see radiation oncology about palliative care radiation therapy. We could hold the capecitabine during the radiation if needed.      CKD with mild distention

## 2017-11-24 NOTE — PROGRESS NOTES
Palliative Care Follow up Note    Patient Name: Ren Tidwell   YOB: 1935   Medical Record Number: [id number]   CSN: [id number]   Date of visit: 11/23/2017       Chief Complaint/Reason for Visit:  Palliative follow up     History of Present None on file     Social History Narrative    Lives with daughter; retired and        Medications:    Current Outpatient Prescriptions:   •  [START ON 12/7/2017] OxyCODONE HCl ER (OXYCONTIN) 15 MG Oral Tablet Extended Release 12 hour Abuse-Deterren palpitations  Abdomen:  Denies constipation, diarrhea. Denies pain. Psych:  No complaints. Sleeping well      Palliative Performance Scale:   70%      Physical Examination:   General: alert and oriented, not in acute distress.   HEENT:  Turbinates josé

## 2017-11-27 NOTE — TELEPHONE ENCOUNTER
Pt's daughter called and left message. She would like tumor marker results. Dr. Osmel Mckeon informed.

## 2017-11-29 NOTE — PROGRESS NOTES
Nursing Re- Consult Note    Patient: Fran Black  YOB: 1935  Age: 81year old  Taylor Foy MD  Referring Physician: Chuy Gibson  Diagnosis: METASTATIC BREAST CANCER  Consult Date: 11/29/2017    H mild peeling to palms of hands, started recently   Neurological: Negative. Endo/Heme/Allergies: Negative. Psychiatric/Behavioral: Negative.         Medications and Allergies review by RN: yes

## 2017-11-30 NOTE — PROGRESS NOTES
CC:Patient presents with:  Joint Pain: right ankle      HPI:   Alva Curry is a(n) 81year old female followed by Dr. Eduin Aburto for metastatic breast cancer. Her cancer history dates back to 2012 when she was found to have a left side breast cancer.   ER pos REVIEWED:  xeloda cycle started 11/27  xarelto compliant    Blood pressure 142/69, pulse 68, temperature (!) 96.9 °F (36.1 °C), temperature source Tympanic, resp. rate 16, weight 56.4 kg (124 lb 6.4 oz), SpO2 91 %. ROS Pertinent items are noted in HPI. patients emotional well being, plan of care,  Reviewing lab/test results.   Patient/family verbalized understanding of plan of care        ROLLY Keller  11/30/2017

## 2017-11-30 NOTE — PROGRESS NOTES
[facility]    PATIENT'S NAME: Melinda Marcus   RADIATION ONCOLOGIST: Mario Bunch M.D.    PATIENT ACCOUNT #: [de-identified] Elvis Carey   [facility]   MEDICAL RECORD #: [id number] YOB: 1935   FOLLOW-UP DATE: 11/29/2017       RADIATIO intermittent hesitancy when urinating. She has some mild irritation of the palms of her hands and her feet due to Xeloda. PHYSICAL EXAMINATION:  KPS= 90%  GENERAL:  An elderly female in no acute distress.   VITAL SIGNS:  Blood pressure 109/70, pulse i 04:05:58  Deshawn Gasca [id number]  /

## 2017-11-30 NOTE — PATIENT INSTRUCTIONS
Apply heat to right ankle  Continue xeloda  Start clindamycin if heat/redness worsen and pain gets worse.

## 2017-12-07 NOTE — PATIENT INSTRUCTIONS
POST-RADIATION INSTRUCTIONS:   - FOLLOW-UP WITH DR. William Gooden AS NEEDED  - FOLLOW-UP WITH DR. Angel Luis Painting ON 12/22/17 AT 11:15 AM  - SIDE EFFECTS OF RADIATION WILL GRADUALLY SUBSIDE, IT MAY TAKE UP TO 2 WEEKS POST-RADIATION FOR YOU TO NOTICE CHANGES; SUCH AS A D

## 2017-12-07 NOTE — PROGRESS NOTES
[facility] Radiation Treatment Management Note 1-5    Patient:  Rowan Youssef  Age:  81year old  Visit Diagnosis:    1.  Bone metastases ([address]      Primary Rad/Onc:  Dr. Graciela Smith    Site Delivered Dose (Gy) Prescribed Dose

## 2017-12-08 NOTE — PROGRESS NOTES
Education Record    Learner:  Patient    Disease / Cherre Pepper cancer    Barriers / Limitations:  None    Method:  Brief focused, printed material and  reinforcement    General Topics:  Plan of care reviewed    Outcome:  Shows understanding

## 2017-12-11 PROBLEM — S42.202A CLOSED FRACTURE OF PROXIMAL END OF LEFT HUMERUS, UNSPECIFIED FRACTURE MORPHOLOGY, INITIAL ENCOUNTER: Status: ACTIVE | Noted: 2017-01-01

## 2017-12-11 NOTE — ED INITIAL ASSESSMENT (HPI)
PT TRIPPED AND FELL DOWN 3 STEPS ONTO LEFT SHOULDER AND HEAD., NO LOC, CO LEFT SHOULDER PAIN AND LEFT HAND

## 2017-12-11 NOTE — ED PROVIDER NOTES
Patient Seen in: [facility] Emergency Department In [place]    History   Patient presents with:  Upper Extremity Injury (musculoskeletal)    Stated Complaint: LEFT SHOULDER INJURY    HPI    Patient is an 21-year-old female comes emergency room for evaluat conjunctiva is pink.   Oropharynx is unremarkable, no exudate, dentition intact, no facial bone tenderness or septal hematomas, TMs clear bilaterally  NECK: Supple, trachea midline, no lymphadenopathy, no midline cervical spine tenderness, full ROM cervical proximal forearm. She also has a skin tear to this area. FINDINGS:  BONES:  No significant arthropathy or acute abnormality. SOFT TISSUES:  No visible soft tissue swelling. EFFUSION:  None visible. CONCLUSION:  1. Osseous structures are intact. humeral neck fracture . No dislocation. There is a well delineated lucency involving the proximal humerus, a pathologic fracture cannot be excluded. CONCLUSION:  1. Findings suggest a pathologic comminuted fracture involving the left humeral neck. am    Follow-up:  Arlene Flaherty MD  [id number] [facility] [id number]  [phone]    In 2 days          Medications Prescribed:  Discharge Medication List as of 12/11/2017 10:13 AM

## 2017-12-12 NOTE — TELEPHONE ENCOUNTER
Patient fell a couple of days ago and broke her arm. She completed RT to sacrum last week with little pain benefit. She is now complaining of sacrum and slightly higher back pain since fall. Her arm is also painful.   She is supposed to be sleeping sitti

## 2017-12-12 NOTE — TELEPHONE ENCOUNTER
Pt fell and broke her arm yesterday. She saw orthoDr. Nuno yesterday. She was told the arm may heal with gravity. She was told to sleep in a recliner. She was up every hour last night. She is having pain in her arm and pelvis.   She is taking o

## 2017-12-12 NOTE — PROGRESS NOTES
RADIATION ONCOLOGY TREATMENT SUMMARY         [id number] [address] [place] [address] Location: [facility]   Date of Birth   9/12/1935 Radiation Oncologist     Clotilde Priest MD, [facility] TREATMENT SUMMARY     [facility] Jennifer Bolton injury to surrounding normal tissues such as bowel, sacral nerve roots, and rectum. The following summarizes the radiation therapy (SBRT) that was delivered.     TECHNICAL SUMMARY:     [address] [place] [address]    Patient Name: Fiona Hewitt  Patient I

## 2017-12-13 NOTE — PROGRESS NOTES
Faxed signed order for hospital bed and notes to [facility], [phone].

## 2017-12-14 PROBLEM — G89.3 CANCER RELATED PAIN: Status: ACTIVE | Noted: 2017-01-01

## 2017-12-14 PROBLEM — R52 PAIN: Status: ACTIVE | Noted: 2017-01-01

## 2017-12-14 NOTE — H&P
[facility] HOSPITAL[facility]  History and Physical     Kvng Duarte Patient Status:  Inpatient    1935 MRN [id number]   [facility] 4NW-A Attending Shana Song MD   Hosp Day # 0 PCP None Pcp     Chief Complaint: Weakness and pain    Histo Itching  Tramadol                Nausea and vomiting  Fentanyl Citrate [F*    Rash    Comment:Fentanyl patch caused localized rash around patch             site. Medications:    No current facility-administered medications on file prior to encounter. point review of systems was completed. Pertinent positives and negatives noted in the HPI.     Physical Exam:    /54 (BP Location: Right arm)   Pulse 77   Temp 99 °F (37.2 °C) (Oral)   Resp 18   Ht 157.5 cm (5' 2\")   Wt 122 lb 9.6 oz (55.6 kg)   S needed  4. URI symptoms  1. Chest x-ray  2. RVP  5. Metastatic breast cancer  1. On Xeloda  2. Oncology following  6. History of DVT  1. Continue xarelto  7. Anemia of chronic disease  8. Malnutrition  1. Dietician eval  9. Nicotine dependence  1.  Nicotine

## 2017-12-14 NOTE — PROGRESS NOTES
NURSING ADMISSION NOTE      Patient admitted via Wheelchair  Oriented to room. Safety precautions initiated. Bed in low position. Call light in reach. Direct admit from [address] [place] [address] for cough. Hx breast cancer. Humeral fx left, pt in sling.  C/

## 2017-12-14 NOTE — PROGRESS NOTES
[facility] forwarded RX to be signed by MD.  [facility] notified [facility] that patient was admitted today, and will not need bed at this time.

## 2017-12-14 NOTE — PROGRESS NOTES
ANP Visit Note    Patient Name: Lenny Gill   YOB: 1935   Medical Record Number: [id number]   CSN: [id number]   Date of visit: 12/14/2017       Chief Complaint/Reason for Visit:  Metastatic Breast Cancer, Pain, Cough, Low-grade fever    Onc Tape           Itching  Tramadol                Nausea and vomiting  Fentanyl Citrate [F*    Rash    Comment:Fentanyl patch caused localized rash around patch             site.     Family History:  Family History   Problem Relation Age of Onset   • Ovarian every 6 (six) hours as needed for Nausea., Disp: 30 tablet, Rfl: 3  •  furosemide (LASIX) 20 MG Oral Tab, Take 1 tablet (20 mg total) by mouth daily. , Disp: 30 tablet, Rfl: 0  •  Potassium Chloride ER 20 MEQ Oral Tab CR, Take 1 tablet (20 mEq total) by mauricio Date: 12/11/2017  Value: Auto Resulted                       Status: Final  ------------    Impression/Plan:  1. Metastatic Breast Cancer: Xeloda   2. Left arm fracture: in immobilizer, needs to be kept up right.    3. Pain: Continue Oxy ER,

## 2017-12-15 PROBLEM — R52 INTRACTABLE PAIN: Status: ACTIVE | Noted: 2017-01-01

## 2017-12-15 NOTE — PALLIATIVE CARE NOTE
[address] Work Initial Consult      Reason for Consultation:  Evaluation of palliative care needs. Individuals present:  Pt and pts daughter.     Coping Status: __x__Coping well        Support Systems: ___x__Adequat

## 2017-12-15 NOTE — PLAN OF CARE
Verbalizes/displays adequate comfort level or patient's stated pain goal Progressing      Achieves optimal ventilation and oxygenation Progressing      Free from fall injury Progressing        Drowsy,but arosable. with left arm sling. IVF infusing well.

## 2017-12-15 NOTE — PHYSICAL THERAPY NOTE
PHYSICAL THERAPY EVALUATION - INPATIENT     Room Number: 420/420-A  Evaluation Date: 12/15/2017  Type of Evaluation: Initial  Physician Order: PT Eval and Treat    Presenting Problem: cancer related pain  Reason for Therapy: Mobility Dysfunction and Elenore Donate mechanics; Relaxation;Breathing techniques;Repositioning    COGNITION  · Orientation Level:  oriented x4  · Safety Judgement:  good awareness of safety precautions  · Problem Solving:  able to problem solve independently    RANGE OF MOTION AND STRENGTH ASSE mobility  Body mechanics  Energy conservation  Functional activity tolerated  Gait training  Transfer training    Patient End of Session: Up in chair;Needs met;Call light within reach;RN aware of session/findings; All patient questions and concerns Mary Ann Urias Goal #6    Goal Comments: Goals established on 12/15/2017

## 2017-12-15 NOTE — OCCUPATIONAL THERAPY NOTE
OCCUPATIONAL THERAPY EVALUATION - INPATIENT     Room Number: 420/420-A  Evaluation Date: 12/15/2017  Type of Evaluation: Initial  Presenting Problem: dehydration, cancer pain and cough     Physician Order: IP Consult to Occupational Therapy  Reason for The pt was ambulating without an AD and was independent with ADL and functional mobility. SUBJECTIVE   \"I am having the most trouble with taking a bath right now. \"     Patient self-stated goal is to go home      OBJECTIVE  Precautions: Hard of hearing; Ot (AM-PAC Scale): 37.26  CMS Modifier (G-Code): CK    FUNCTIONAL TRANSFER ASSESSMENT  Supine to Sit : Moderate assistance  Sit to Stand: Moderate assistance    Skilled Therapy Provided: Pt was found in bed in a semi-supine position.  Pt performed supine>sit E Assessment/Performance Deficits MODERATE - Comorbidities and min to mod modifications of tasks    Clinical Decision Making MODERATE - Analysis of occupational profile, detailed assessments, several treatment options    Overall Complexity MODERATE     OT Jailyn Brantley

## 2017-12-15 NOTE — PROGRESS NOTES
Heme/Onc Progress Note - Adam      Chief Complaint:    Follow up for diagnosis of metastatic breast cancer with bone metastases related intractable pain. Interim History:      She has better control of her pain this morning.  She was able to sleep be this with the patient's daughter on the phone. She also understands and agrees.  We will need to explore the patient's options for outpatient palliative care management +/- hospice care.     Continue with aggressive pain management.      2. H/o DVT in left

## 2017-12-15 NOTE — CONSULTS
[address]  [id number]  Hospital Day # 1  Date of Consult:  12/15/2017    Reason for Consultation:  Consult requested by Dr. Leonor Baltazar for evaluation of palliative care needs and  Goals of care.     History of Present Illn to hospice at home if possible. Patient doesn't want to die at home so they are interested in [facility].       Pain/Symptom Management:  She will benefit from increasing LA opioid and from multimodal analgesia  oxycontin to 40mg Q 12.   fatou todd

## 2017-12-15 NOTE — PROGRESS NOTES
[facility] HOSPITALIST  Progress Note     Alva Curry Patient Status:  Inpatient    1935 MRN [id number]   [facility] 4NW-A Attending Pb Oliveros MD   Hosp Day # 1 PCP None Pcp     Chief Complaint: pain    S: Patient feels better tod ASSESSMENT / PLAN:     1. Weakness and dehydration  1. PT eval  2. Cont. To hold diuretics  2. Cancer related pain  1. Now much better   2. CPM, uptitrate depending on her level of pain  3. URI symptoms  1. Chest x-ray unremarkable  2. RVP neg  4.  Me

## 2017-12-15 NOTE — CONSULTS
[facility]  Report of Consultation    Rickie Loyd Patient Status:  Inpatient    1935 MRN [id number]   [facility] 4NW-A Attending Sanjiv Rowe MD   Hosp Day # 0 PCP None Pcp     Reason for Consultation:    Evaluation for a di she does not use drugs. Allergies:    Adhesive Tape           Itching  Tramadol                Nausea and vomiting  Fentanyl Citrate [F*    Rash    Comment:Fentanyl patch caused localized rash around patch             site.     Medications:    Current Fa Oropharynx is clear. Neck: No palpable lymphadenopathy. Neck is supple. Chest: Clear to auscultation. Heart: Regular rate and rhythm. Abdomen: Soft, non tender with good bowel sounds.   Extremities: No edema  Psych/Depression: She was tired at this vi should continue with aggressive pain management. 2. H/o DVT in left leg:    Continue rivaroxaban for now. 3. 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  I will reassess in

## 2017-12-15 NOTE — CM/SW NOTE
12/15/17 1400   CM/SW Referral Data   Referral Source Social Work (self-referral)   Reason for Referral Discharge planning   Informant Patient; Children   Patient [facility]   Patient lives with Children   Discharge Needs   A

## 2017-12-16 NOTE — PROGRESS NOTES
[facility] H[facility]PITAL[facility]  Progress Note     Ren Tidwell Patient Status:  Inpatient    1935 MRN [id number]   [facility] 4NW-A Attending Olga Lidia Mota MD   Hosp Day # 2 PCP None Pcp     Chief Complaint: pain    S: Patient states that her 1. Weakness and dehydration  1. Cont. To hold diuretics  2. Cont. To ambulate  2. Cancer related pain  1. Now much better   2. Cont. Current regimen  3. URI symptoms  1. Chest x-ray unremarkable  2. RVP neg  3. resolved  4. Metastatic breast cancer  1.

## 2017-12-16 NOTE — PLAN OF CARE
Achieve highest/safest level of mobility/gait Progressing      Return mobility to safest level of function Progressing      Free from fall injury Progressing      Verbalizes/displays adequate comfort level or patient's stated pain goal Progressing    Pain

## 2017-12-16 NOTE — CM/SW NOTE
Updated notes sent to [facility] including onc note from today. Await response re: acceptance. They had questions re: whether pt was going to have ongoing cancer tx will at the nursing home.   Marika Nunez, 12/16/17, 3:50 PM

## 2017-12-16 NOTE — PROGRESS NOTES
Heme/Onc Progress Note       Chief Complaint:    Follow up for diagnosis of metastatic breast cancer with bone metastases related intractable pain. Interim History:      Pain mainly in left shoulder. Has ice packs.  No pain if she does not move her s Oncology Group

## 2017-12-17 NOTE — PROGRESS NOTES
Heme/Onc Progress Note       Chief Complaint:    Follow up for diagnosis of metastatic breast cancer with bone metastases related intractable pain. Interim History:    Pain controlled if she limits movement of left arm/shoulder.  Pain medications and >/= 7    Awaiting response from [facility] whether bed available and patient accepted. No plans for systemic therapy for cancer outpatient.       Yaritza Cary MD  [facility]

## 2017-12-17 NOTE — PROGRESS NOTES
[facility]PITAL[facility]  Progress Note     Gigi Martino Patient Status:  Inpatient    1935 MRN [id number]   [facility] 4NW-A Attending Danette Cruz MD   Hosp Day # 3 PCP None Pcp     Chief Complaint: pain    S: Patient's pain is stable diuretics  2. Cont. To ambulate  3. Slowly improving  2. Cancer related pain  1. Now much better   2. Cont. Current regimen  3. URI symptoms  1. Chest x-ray unremarkable  2. RVP neg  3. resolved  4. Metastatic breast cancer  1. Off systemic therapy now  2.

## 2017-12-18 NOTE — CM/SW NOTE
12/18/17 1600   Discharge disposition   Discharged to: Skilled Nurs   Name of [facility]   Discharge transportation [address] [address] from [place] aware of dc. Daughter aware.  [facility] to arrive at 7:00pm    Elizabeth

## 2017-12-18 NOTE — PROGRESS NOTES
[facility]PITAL[facility]  Progress Note     Kvng Duarte Patient Status:  Inpatient    1935 MRN [id number]   [facility] 4NW-A Attending Shana Song MD   Hosp Day # 4 PCP None Pcp     Chief Complaint: pain    S: Patient is doing well ov ambulate  3. Slowly improving  2. Cancer related pain  1. stable  2. Cont. Current regimen  3. URI symptoms  1. Chest x-ray unremarkable  2. RVP neg  3. resolved  4. Metastatic breast cancer  1. Off systemic therapy now  2. Oncology following  5.  Left Arm

## 2017-12-18 NOTE — CM/SW NOTE
SW met w/pt and pt's daughter regarding dc plan. Pt and daughter are still wanting [facility] at this time. CIERA explained since the pt does not have a skilled need her insurance will most likely only way for a week of [facility].  Informed them the pt may n

## 2017-12-18 NOTE — PALLIATIVE CARE NOTE
Edwardo [address] Work Follow Up    Discussion:  Pt known to [facility] RO[facility]/Leigh Miguel Lacks from Outpatient Palliative Care. The plan remains for pt to go to [facility] at d/c with [address] to follow.      No further PCSW ne

## 2017-12-18 NOTE — PHYSICAL THERAPY NOTE
PHYSICAL THERAPY TREATMENT NOTE - INPATIENT    Room Number: 420/420-A     Session: 1  Number of Visits to Meet Established Goals: 5    Presenting Problem: cancer related pain     History related to current admission: Pt was admitted from home on 12/14/201 -   Sitting down on and standing up from a chair with arms (e.g., wheelchair, bedside commode, etc.): A Little   -   Moving from lying on back to sitting on the side of the bed?: None   How much help from another person does the patient currently need. Harry Beltre decreased balance in sitting demonstrated w/ a lateral trunk lean to the right.  Pt would benefit from continued skilled IP PT during hospital admission at [facility] to address balance impairments w/ gait and transfers, impaired LE strength and pain m

## 2017-12-18 NOTE — PLAN OF CARE
METABOLIC/FLUID AND ELECTROLYTES - ADULT    • Electrolytes maintained within normal limits Adequate for Discharge        RESPIRATORY - ADULT    • Achieves optimal ventilation and oxygenation Adequate for Discharge          Impaired Functional Mobility    •

## 2017-12-18 NOTE — PROGRESS NOTES
Heme/Onc Progress Note       Chief Complaint:    Follow up for diagnosis of metastatic breast cancer with bone metastases related intractable pain. Interim History:      No pain at rest.  Pain is only when she moves. No complaints this morning. 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 [facility]      Heme/Onc Consult:    The patient is comfortable at this visit.  The plan is for d/c to [facility] with eventual pall

## 2017-12-20 NOTE — DISCHARGE SUMMARY
[facility] HOSPITALIST  DISCHARGE SUMMARY     Kelsie Clark Patient Status:  Inpatient    1935 MRN [id number]   [facility] 4NW-A Attending No att. providers found   Hosp Day # 4 PCP None Pcp     Date of Admission: 2017  Date of Disc fell down. She has had significant worsening of her pain in her lower back area as well. Brief Synopsis:     The patient was diagnosed with dehydration, weakness and uncontrolled cancer related pain.  She was started on IV fluids and her pain medication for Cramping. Quantity:  30 tablet  Refills:  0     mirtazapine 15 MG Tabs  Commonly known as:  REMERON      Take 1 tablet (15 mg total) by mouth nightly.    Quantity:  30 tablet  Refills:  3     Pantoprazole Sodium 40 MG Tbec  Commonly known as:  PROTONI Respiratory: Clear to auscultation bilaterally  Cardiovascular: S1, S2  Abdomen: Soft, nontender, nondistended      -----------------------------------------------------------------------------------------------  PATIENT DISCHARGE INSTRUCTIONS: See elect

## 2018-01-01 ENCOUNTER — TELEPHONE (OUTPATIENT)
Dept: HEMATOLOGY/ONCOLOGY | Facility: HOSPITAL | Age: 83
End: 2018-01-01

## 2018-01-01 ENCOUNTER — APPOINTMENT (OUTPATIENT)
Dept: RADIATION ONCOLOGY | Age: 83
End: 2018-01-01
Attending: RADIOLOGY
Payer: MEDICARE

## 2018-01-01 ENCOUNTER — SNF/IP PROF CHARGE ONLY (OUTPATIENT)
Dept: HEMATOLOGY/ONCOLOGY | Facility: HOSPITAL | Age: 83
End: 2018-01-01

## 2018-01-01 ENCOUNTER — APPOINTMENT (OUTPATIENT)
Dept: HEMATOLOGY/ONCOLOGY | Age: 83
End: 2018-01-01
Attending: INTERNAL MEDICINE
Payer: MEDICARE

## 2018-01-01 DIAGNOSIS — C50.812 MALIGNANT NEOPLASM OF OVERLAPPING SITES OF LEFT BREAST IN FEMALE, ESTROGEN RECEPTOR POSITIVE (HCC): ICD-10-CM

## 2018-01-01 DIAGNOSIS — Z17.0 MALIGNANT NEOPLASM OF OVERLAPPING SITES OF LEFT BREAST IN FEMALE, ESTROGEN RECEPTOR POSITIVE (HCC): ICD-10-CM

## 2018-01-01 DIAGNOSIS — C50.919 METASTATIC BREAST CANCER (HCC): Primary | ICD-10-CM

## 2018-01-01 PROCEDURE — G9678 ONCOLOGY CARE MODEL SERVICE: HCPCS | Performed by: INTERNAL MEDICINE

## 2018-01-08 NOTE — TELEPHONE ENCOUNTER
Does patient need to follow up with Dr. Luis Johansen? Per Dr. Luis Johansen -- follow up as needed.

## 2018-01-17 NOTE — TELEPHONE ENCOUNTER
Amalia Mckinley NP from [facility] requested an order for Hospice consult.  Order faxed to [phone]

## 2021-02-03 DIAGNOSIS — Z23 NEED FOR VACCINATION: ICD-10-CM

## (undated) NOTE — ED AVS SNAPSHOT
Crescencio Reddy   MRN: [id number]    Department:  THE [facility] Emergency Department in [place]   Date of Visit:  12/11/2017           Disclosure     Insurance plans vary and the physician(s) referred by the ER may not be covered by your plan.  Please contac tell this physician (or your personal doctor if your instructions are to return to your personal doctor) about any new or lasting problems. The primary care or specialist physician will see patients referred from the [facility] Emergency Department.  Александр Fields

## (undated) NOTE — IP AVS SNAPSHOT
Patient Demographics     Address  [address] [place] [address] [place] [address] Phone  [phone] [facility])  [phone] (Mobile) *Preferred* E-mail Address  [email or web address]      Emergency Contact(s)     Name Relation Home Work Mobile    AydeAdrianna tseie Daughter [address] Leon         OxyCODONE HCl IR 10 MG Tabs  Commonly known as:  ROXICODONE      Take 1 tablet (10 mg total) by mouth every 4 (four) hours as needed.   Stop taking on:  1/17/2018   Randi Morales MD         OxyCODONE HCl ER 30 MG T12a      Take 3 [id number] Senna (SENOKOT) tab TABS 17.2 mg 12/17/17 2008 Given      [id number] Senna (SENOKOT) tab TABS 17.2 mg 12/18/17 0953 Given      [id number] docusate sodium (COLACE) cap 100 mg 12/17/17 2008 Given      [id number] docusate sodium (COLACE) cap 100 mg 12 H&P signed by Shana Song MD at 12/14/2017  2:17 PM  Version 1 of 1    Author:  Shana Song MD Service:  Hospitalist Author Type:  Physician    Filed:  12/14/2017  2:17 PM Date of Service:  12/14/2017  2:00 PM Status:  Signed    :  Charlene Jeffries reports that she drinks alcohol. She reports that she does not use drugs.     Family History:   Family History   Problem Relation Age of Onset   • Ovarian Cancer Maternal Aunt    • Colon Cancer Sister    • Cancer Mother    • Cancer Maternal Grandfather    • furosemide (LASIX) 20 MG Oral Tab Take 1 tablet (20 mg total) by mouth daily. Disp: 30 tablet Rfl: 0   Potassium Chloride ER 20 MEQ Oral Tab CR Take 1 tablet (20 mEq total) by mouth daily.  Disp: 30 tablet Rfl: 0   Polyethylene Glycol 3350 (MIRALAX) Oral Po Imaging: Imaging data reviewed in Epic. ASSESSMENT / PLAN:     1. Weakness and dehydration  1. Gentle IV fluids  2. Hold diuretics  3. PT eval  2. Lower back pain  1. Likely due to worsening metastatic disease  2. Check x-ray  3.  Cancer related pain metastatic breast cancer with diffuse bone metastases. She has ER positive disease. She has had treatment with endocrine therapy. She now has been getting oral capecitabine. She recently had a fall three days ago with a left humerus fracture.  She also rece •  docusate sodium (COLACE) cap 100 mg, 100 mg, Oral, BID  •  zinc sulfate (ZINCATE) cap 220 mg, 220 mg, Oral, Daily  •  Potassium Chloride ER (K-DUR M20) CR tab 20 mEq, 20 mEq, Oral, Daily  •  Prochlorperazine Maleate (COMPAZINE) tab 10 mg, 10 mg, Oral, Q NEPRELIM  6.92*   WBC  7.9   PLT  124.0*       Recent Labs   Lab  12/14/17   1208   GLU  115*   BUN  21*   CREATSERUM  1.01   CA  8.9   ALB  2.7*   NA  138   K  4.7   CL  108   CO2  24.0   ALKPHO  46*   AST  22   ALT  14   BILT  0.7   TP  6.0*       Impres Electronically signed by Karlie Godfrey MD on 12/14/2017  8:59 PM   Attribution [employer]    JK. 1 - Karlie Godfrey MD on 12/14/2017  8:47 PM                     D/C Summary     No notes of this type exist for this encounter.          Physical Therapy Notes (las OBJECTIVE[RW.1]  Precautions: Hard of hearing; Other (Comment); Limb alert - left (recent L humeral neck fracture s/p fall 12/11/17(in sling))[RW.2]    WEIGHT BEARING RESTRICTION[RW.1]  Weight Bearing Restriction: L upper extremity     L Upper Extremity: Non and legs. Pt transferred from sitting to standing w/ min A, pt requesting to pull up using therapists arm. Pt ambulated 10ft to bathroom w/o AD w/ min Ax1 for LOB multiple times.  Pt transferred from standing to sitting on toilet w/ min A for eccentric cont conservation;Patient education;Gait training;Family education;Stair training;Transfer training;Balance training  Rehab Potential : Fair  Frequency (Obs): 5x/week[RW.2]    CURRENT GOALS      Goal #1 Patient is able to demonstrate supine - sit EOB @ level: s Pain from bone metastases (HCC)    Intractable pain      Past Medical History  Past Medical History:   Diagnosis Date   • Breast cancer ([facility] [address] 2012    LEFT BREAST CANCER   • Depression        Past Surgical History  Past Surgical History:  No date: Ludmila Whitfield Gait Assistance: Maximum assistance (Actual FIM; CGA )  Distance (ft): 75ft  Assistive Device: Cane  Pattern: Shuffle (Kyphotic )  Stoop/Curb Assistance: Not tested       Skilled Therapy Provided: Pt received in semi-supine position.  Pt transitioned from s should achieve mod I level in mobility. DISCHARGE RECOMMENDATIONS  PT Discharge Recommendations: Sub-acute rehabilitation ([facility] 16-19 days )     PLAN  PT Treatment Plan: Bed mobility; Body mechanics; Endurance; Energy conservation;Patient education;Gait tr

## (undated) NOTE — IP AVS SNAPSHOT
[address]            (For Outpatient Use Only) Initial Admit Date: 12/14/2017   Inpt/Obs Admit Date: Inpt: 12/14/17 / Obs: N/A   Discharge Date:    Hospital Acct:  [de-identified]   MRN: [de-identified]   CSN: [id number]        [id number]  IZJJL Hospital Account Financial Class: Medicare    December 18, 2017

## (undated) NOTE — MR AVS SNAPSHOT
After Visit Summary   5/8/2017    Yumiko Fay    MRN: [id number]           Diagnoses this Visit     Bone metastases [facility])    -  Primary     Liver metastases ([facility] [address]         Cancer of overlapping sites of left female breast ([facility] [address]           Allergies visit,  view other health information, and more. To sign up or find more information, go to [email or web address] and click on the Sign Up Now link in the [employer] box.      Enter your [employer] Activation Code exactly as it appears below along with yo

## (undated) NOTE — ED AVS SNAPSHOT
Valerie Whitmore   MRN: [id number]    Department:  THE [facility] Emergency Department in [place]   Date of Visit:  11/11/2017           Disclosure     Insurance plans vary and the physician(s) referred by the ER may not be covered by your plan.  Please contac If you have been prescribed any medication(s), please fill your prescription right away and begin taking the medication(s) as directed    If the emergency physician has read X-rays, these will be re-interpreted by a radiologist.  If there is a significant

## (undated) NOTE — MR AVS SNAPSHOT
After Visit Summary   6/16/2017    Lebron Flores    MRN: [id number]           Diagnoses this Visit     Bone metastases [facility])    -  Primary     Liver metastases ([facility] [address]         Malignant neoplasm of overlapping sites of left breast in female, estrogen rec Monday July 17, 2017     LAB:  [facility] Singleton     Sign up for "[employer]t, your secure online medical record. "[employer]" will allow you to access patient instructions from your recent visit,  view other health information, and more.  To sign up or find

## (undated) NOTE — MR AVS SNAPSHOT
After Visit Summary   6/16/2017    Teo Rudolph    MRN: [id number]           Diagnoses this Visit     Bone metastases [facility])    -  Primary     Liver metastases ([facility] [address]         Malignant neoplasm of overlapping sites of left breast in female, estrogen rec CBC W/ DIFFERENTIAL[[id number]]          Abnormal            Final result                 Please view results for these tests on the individual orders.          Result Summary for COMP METABOLIC PANEL (14)      Component Results     Component Value Flag Ref Neutrophil Absolute 1.72  1.30-6.70  x10(3) uL Final    Lymphocyte Absolute 0.25 (L) 0.90-4.00  x10(3) uL Final    Monocyte Absolute 0.11  0.10-0.60  x10(3) uL Final    Eosinophil Absolute 0.06  0.00-0.30  x10(3) uL Final    Basophil Absolute 0.02  0.00-0

## (undated) NOTE — Clinical Note
Patient new shoulder pain, xray neg for fracture, its either xeloda toxicities she has mouth sores and stomach pains vs bone mets. She started xeloda again this am. Do you want further imaging? Dose reduction or stop until symptoms resolve?  If you want to

## (undated) NOTE — MR AVS SNAPSHOT
After Visit Summary   5/19/2017    Heavenly Iraheta    MRN: [id number]           Diagnoses this Visit     Bone metastases [facility])    -  Primary     Liver metastases ([facility] [address]         Malignant neoplasm of overlapping sites of left breast in female, estrogen rec information, go to [email or web address] and click on the Sign Up Now link in the [employer] box. Enter your [employer] Activation Code exactly as it appears below along with your Zip Code and Date of Birth to complete the sign-up process.  If you do

## (undated) NOTE — MR AVS SNAPSHOT
After Visit Summary   4/20/2017    Yusuf Urrutia    MRN: [id number]           Diagnoses this Visit     Bone metastases [facility])    -  Primary     Liver metastases ([facility] [address]         Cancer of overlapping sites of left female breast ([facility] [address]           Allergies

## (undated) NOTE — MR AVS SNAPSHOT
After Visit Summary   4/26/2017    Yusuf Urrutia    MRN: [id number]           Diagnoses this Visit     Malignant bone pain    -  Primary     Drug-induced constipation         Difficulty sleeping           Allergies     Tramadol Nausea and vomiting    F GLAND ADRENAL MASS BONE PELVIS CTA ABDOMEN CTA SANJEEV RUNOFF CTA PELVIS GASTRECTOMY HEMATURIA KIDNEY MASS KIDNEY STONES PANCREAS/PANCREATITIS PANCREATIC MASS RENAL ARTERY RETROPERITONEAL BLEED RENAL CALCULUS/CALCULI RENAL MASS RENAL STONES SPLENIC ARTERY Yusufleslie Elmer first Readicat 90 minutes prior to your appointment time. Try to drink this amount within 25 minutes. Start drinking 1/2 of the second bottle 30 minutes to your appointment time. Try and drink this amount within 15 minutes.  Drink the last 1/2 of the second

## (undated) NOTE — LETTER
Printed: 2017    Patient Name: Shea Estrada  : 1935   Medical Record #: [id number]    Consent to Chemotherapy    I, Shea Estrada, understand that I have been diagnosed with metastatic breast cancer.     I understand that the treatment sugg For patients requiring translation or verbal reading of this document, the person reading/translating should document and sign below:    [place]/ Signature                                           Date

## (undated) NOTE — MR AVS SNAPSHOT
After Visit Summary   6/16/2017    Lisandro Brink    MRN: [id number]           Diagnoses this Visit     Bone metastases [facility])    -  Primary     Liver metastases ([facility] [address]         Malignant neoplasm of overlapping sites of left breast in female, estrogen rec Enter your [employer] Activation Code exactly as it appears below along with your Zip Code and Date of Birth to complete the sign-up process. If you do not sign up before the expiration date, you must request a new code.     Your unique [employer] Access Code: [email or web address]

## (undated) NOTE — MR AVS SNAPSHOT
After Visit Summary   5/8/2017    Katrin Staples    MRN: [id number]           Diagnoses this Visit     Pain due to malignant neoplasm metastatic to bone [facility])    -  Primary     Drug-induced constipation         Fatigue due to treatment         Other dep

## (undated) NOTE — MR AVS SNAPSHOT
After Visit Summary   6/2/2017    Valerie Allisonia    MRN: [id number]           Diagnoses this Visit     Bone metastases [facility])    -  Primary     Liver metastases ([facility] [address]         Cancer of overlapping sites of left female breast ([facility] [address]         Malignant neopl Procedure                               Abnormality         Status                     ---------                               -----------         ------                     CBC W/ DIFFERENTIAL[[id number]]          Abnormal            Final result Neutrophil % 88.7   % Final    Lymphocyte % 6.4   % Final    Monocyte % 2.8   % Final    Eosinophil % 1.3   % Final    Basophil % 0.4   % Final    Immature Granulocyte % 0.4   % Final               MyChart     Sign up for MyChart, your secure online medic